# Patient Record
Sex: FEMALE | Race: WHITE | NOT HISPANIC OR LATINO | Employment: UNEMPLOYED | URBAN - METROPOLITAN AREA
[De-identification: names, ages, dates, MRNs, and addresses within clinical notes are randomized per-mention and may not be internally consistent; named-entity substitution may affect disease eponyms.]

---

## 2022-09-29 ENCOUNTER — HOSPITAL ENCOUNTER (OUTPATIENT)
Facility: OTHER | Age: 32
Discharge: HOME | DRG: 603 | End: 2022-10-04
Attending: EMERGENCY MEDICINE | Admitting: STUDENT IN AN ORGANIZED HEALTH CARE EDUCATION/TRAINING PROGRAM
Payer: MEDICAID

## 2022-09-29 DIAGNOSIS — L03.116 CELLULITIS OF HIP, LEFT: Primary | ICD-10-CM

## 2022-09-29 DIAGNOSIS — L03.312 CELLULITIS OF BACK EXCEPT BUTTOCK: ICD-10-CM

## 2022-09-29 PROBLEM — L03.317 CELLULITIS OF BUTTOCK: Status: ACTIVE | Noted: 2022-09-29

## 2022-09-29 LAB
ALBUMIN SERPL BCP-MCNC: 3.6 G/DL (ref 3.5–5.2)
ALP SERPL-CCNC: 77 U/L (ref 55–135)
ALT SERPL W/O P-5'-P-CCNC: 7 U/L (ref 10–44)
ANION GAP SERPL CALC-SCNC: 7 MMOL/L (ref 8–16)
AST SERPL-CCNC: 11 U/L (ref 10–40)
B-HCG UR QL: NEGATIVE
BASOPHILS # BLD AUTO: 0.04 K/UL (ref 0–0.2)
BASOPHILS NFR BLD: 0.3 % (ref 0–1.9)
BILIRUB SERPL-MCNC: 0.4 MG/DL (ref 0.1–1)
BUN SERPL-MCNC: 10 MG/DL (ref 6–20)
CALCIUM SERPL-MCNC: 9.2 MG/DL (ref 8.7–10.5)
CHLORIDE SERPL-SCNC: 106 MMOL/L (ref 95–110)
CO2 SERPL-SCNC: 24 MMOL/L (ref 23–29)
CREAT SERPL-MCNC: 0.8 MG/DL (ref 0.5–1.4)
CTP QC/QA: YES
CTP QC/QA: YES
DIFFERENTIAL METHOD: ABNORMAL
EOSINOPHIL # BLD AUTO: 0 K/UL (ref 0–0.5)
EOSINOPHIL NFR BLD: 0.3 % (ref 0–8)
ERYTHROCYTE [DISTWIDTH] IN BLOOD BY AUTOMATED COUNT: 12.9 % (ref 11.5–14.5)
EST. GFR  (NO RACE VARIABLE): >60 ML/MIN/1.73 M^2
GLUCOSE SERPL-MCNC: 79 MG/DL (ref 70–110)
HCT VFR BLD AUTO: 37 % (ref 37–48.5)
HGB BLD-MCNC: 12.2 G/DL (ref 12–16)
IMM GRANULOCYTES # BLD AUTO: 0.07 K/UL (ref 0–0.04)
IMM GRANULOCYTES NFR BLD AUTO: 0.5 % (ref 0–0.5)
LACTATE SERPL-SCNC: 2 MMOL/L (ref 0.5–2.2)
LDH SERPL L TO P-CCNC: 1.15 MMOL/L (ref 0.5–2.2)
LYMPHOCYTES # BLD AUTO: 1.1 K/UL (ref 1–4.8)
LYMPHOCYTES NFR BLD: 7 % (ref 18–48)
MCH RBC QN AUTO: 30.2 PG (ref 27–31)
MCHC RBC AUTO-ENTMCNC: 33 G/DL (ref 32–36)
MCV RBC AUTO: 92 FL (ref 82–98)
MONOCYTES # BLD AUTO: 1 K/UL (ref 0.3–1)
MONOCYTES NFR BLD: 6.3 % (ref 4–15)
NEUTROPHILS # BLD AUTO: 13 K/UL (ref 1.8–7.7)
NEUTROPHILS NFR BLD: 85.6 % (ref 38–73)
NRBC BLD-RTO: 0 /100 WBC
PLATELET # BLD AUTO: 374 K/UL (ref 150–450)
PMV BLD AUTO: 9.9 FL (ref 9.2–12.9)
POTASSIUM SERPL-SCNC: 3.9 MMOL/L (ref 3.5–5.1)
PROT SERPL-MCNC: 7.3 G/DL (ref 6–8.4)
RBC # BLD AUTO: 4.04 M/UL (ref 4–5.4)
SAMPLE: NORMAL
SARS-COV-2 RDRP RESP QL NAA+PROBE: POSITIVE
SODIUM SERPL-SCNC: 137 MMOL/L (ref 136–145)
WBC # BLD AUTO: 15.2 K/UL (ref 3.9–12.7)

## 2022-09-29 PROCEDURE — 96375 TX/PRO/DX INJ NEW DRUG ADDON: CPT

## 2022-09-29 PROCEDURE — 12000002 HC ACUTE/MED SURGE SEMI-PRIVATE ROOM

## 2022-09-29 PROCEDURE — 85025 COMPLETE CBC W/AUTO DIFF WBC: CPT | Performed by: NURSE PRACTITIONER

## 2022-09-29 PROCEDURE — 25000003 PHARM REV CODE 250: Performed by: NURSE PRACTITIONER

## 2022-09-29 PROCEDURE — 81025 URINE PREGNANCY TEST: CPT | Performed by: NURSE PRACTITIONER

## 2022-09-29 PROCEDURE — 99285 EMERGENCY DEPT VISIT HI MDM: CPT | Mod: 25

## 2022-09-29 PROCEDURE — 63600175 PHARM REV CODE 636 W HCPCS: Performed by: PHYSICIAN ASSISTANT

## 2022-09-29 PROCEDURE — U0002 COVID-19 LAB TEST NON-CDC: HCPCS | Performed by: PHYSICIAN ASSISTANT

## 2022-09-29 PROCEDURE — 25000003 PHARM REV CODE 250: Performed by: PHYSICIAN ASSISTANT

## 2022-09-29 PROCEDURE — G0378 HOSPITAL OBSERVATION PER HR: HCPCS

## 2022-09-29 PROCEDURE — 96365 THER/PROPH/DIAG IV INF INIT: CPT

## 2022-09-29 PROCEDURE — 80053 COMPREHEN METABOLIC PANEL: CPT | Performed by: NURSE PRACTITIONER

## 2022-09-29 PROCEDURE — 83605 ASSAY OF LACTIC ACID: CPT | Performed by: NURSE PRACTITIONER

## 2022-09-29 PROCEDURE — 63600175 PHARM REV CODE 636 W HCPCS: Performed by: NURSE PRACTITIONER

## 2022-09-29 PROCEDURE — 25000003 PHARM REV CODE 250: Performed by: SURGERY

## 2022-09-29 PROCEDURE — 63600175 PHARM REV CODE 636 W HCPCS: Performed by: SURGERY

## 2022-09-29 PROCEDURE — 96376 TX/PRO/DX INJ SAME DRUG ADON: CPT

## 2022-09-29 PROCEDURE — 87040 BLOOD CULTURE FOR BACTERIA: CPT | Mod: 59 | Performed by: NURSE PRACTITIONER

## 2022-09-29 PROCEDURE — 96361 HYDRATE IV INFUSION ADD-ON: CPT

## 2022-09-29 RX ORDER — QUETIAPINE 200 MG/1
400 TABLET, FILM COATED, EXTENDED RELEASE ORAL NIGHTLY
Status: DISCONTINUED | OUTPATIENT
Start: 2022-09-29 | End: 2022-10-04 | Stop reason: HOSPADM

## 2022-09-29 RX ORDER — HYDROCODONE BITARTRATE AND ACETAMINOPHEN 5; 325 MG/1; MG/1
1 TABLET ORAL EVERY 6 HOURS PRN
Status: DISCONTINUED | OUTPATIENT
Start: 2022-09-29 | End: 2022-09-30

## 2022-09-29 RX ORDER — CLINDAMYCIN PHOSPHATE 600 MG/50ML
600 INJECTION, SOLUTION INTRAVENOUS
Status: DISCONTINUED | OUTPATIENT
Start: 2022-09-30 | End: 2022-09-30

## 2022-09-29 RX ORDER — QUETIAPINE 400 MG/1
400 TABLET, FILM COATED, EXTENDED RELEASE ORAL NIGHTLY
COMMUNITY
Start: 2022-08-24

## 2022-09-29 RX ORDER — LISDEXAMFETAMINE DIMESYLATE 20 MG/1
20 CAPSULE ORAL EVERY MORNING
COMMUNITY
Start: 2022-09-23

## 2022-09-29 RX ORDER — SODIUM CHLORIDE 0.9 % (FLUSH) 0.9 %
10 SYRINGE (ML) INJECTION EVERY 12 HOURS PRN
Status: DISCONTINUED | OUTPATIENT
Start: 2022-09-29 | End: 2022-10-04 | Stop reason: HOSPADM

## 2022-09-29 RX ORDER — MORPHINE SULFATE 4 MG/ML
4 INJECTION, SOLUTION INTRAMUSCULAR; INTRAVENOUS EVERY 4 HOURS PRN
Status: DISCONTINUED | OUTPATIENT
Start: 2022-09-29 | End: 2022-09-30

## 2022-09-29 RX ORDER — CLINDAMYCIN PHOSPHATE 600 MG/50ML
600 INJECTION, SOLUTION INTRAVENOUS
Status: COMPLETED | OUTPATIENT
Start: 2022-09-29 | End: 2022-09-29

## 2022-09-29 RX ORDER — IBUPROFEN 400 MG/1
400 TABLET ORAL EVERY 6 HOURS PRN
Status: DISCONTINUED | OUTPATIENT
Start: 2022-09-29 | End: 2022-09-30

## 2022-09-29 RX ORDER — ACETAMINOPHEN 325 MG/1
650 TABLET ORAL EVERY 4 HOURS PRN
Status: DISCONTINUED | OUTPATIENT
Start: 2022-09-29 | End: 2022-10-04 | Stop reason: HOSPADM

## 2022-09-29 RX ORDER — MORPHINE SULFATE 4 MG/ML
4 INJECTION, SOLUTION INTRAMUSCULAR; INTRAVENOUS ONCE
Status: COMPLETED | OUTPATIENT
Start: 2022-09-29 | End: 2022-09-29

## 2022-09-29 RX ORDER — ONDANSETRON 2 MG/ML
4 INJECTION INTRAMUSCULAR; INTRAVENOUS EVERY 8 HOURS PRN
Status: DISCONTINUED | OUTPATIENT
Start: 2022-09-29 | End: 2022-10-04 | Stop reason: HOSPADM

## 2022-09-29 RX ORDER — LISDEXAMFETAMINE DIMESYLATE CAPSULES 20 MG/1
20 CAPSULE ORAL EVERY MORNING
Status: DISCONTINUED | OUTPATIENT
Start: 2022-09-30 | End: 2022-10-03

## 2022-09-29 RX ORDER — SODIUM CHLORIDE 450 MG/100ML
INJECTION, SOLUTION INTRAVENOUS CONTINUOUS
Status: DISCONTINUED | OUTPATIENT
Start: 2022-09-29 | End: 2022-10-01

## 2022-09-29 RX ADMIN — HYDROCODONE BITARTRATE AND ACETAMINOPHEN 1 TABLET: 5; 325 TABLET ORAL at 08:09

## 2022-09-29 RX ADMIN — CLINDAMYCIN PHOSPHATE 600 MG: 600 INJECTION, SOLUTION INTRAVENOUS at 04:09

## 2022-09-29 RX ADMIN — SODIUM CHLORIDE: 0.45 INJECTION, SOLUTION INTRAVENOUS at 07:09

## 2022-09-29 RX ADMIN — MORPHINE SULFATE 4 MG: 4 INJECTION, SOLUTION INTRAMUSCULAR; INTRAVENOUS at 04:09

## 2022-09-29 RX ADMIN — SODIUM CHLORIDE, SODIUM LACTATE, POTASSIUM CHLORIDE, AND CALCIUM CHLORIDE 1797 ML: .6; .31; .03; .02 INJECTION, SOLUTION INTRAVENOUS at 04:09

## 2022-09-29 RX ADMIN — MORPHINE SULFATE 4 MG: 4 INJECTION, SOLUTION INTRAMUSCULAR; INTRAVENOUS at 11:09

## 2022-09-29 RX ADMIN — ACETAMINOPHEN 650 MG: 325 TABLET, FILM COATED ORAL at 08:09

## 2022-09-29 RX ADMIN — QUETIAPINE FUMARATE 400 MG: 200 TABLET, EXTENDED RELEASE ORAL at 08:09

## 2022-09-29 RX ADMIN — MORPHINE SULFATE 4 MG: 4 INJECTION, SOLUTION INTRAMUSCULAR; INTRAVENOUS at 06:09

## 2022-09-29 RX ADMIN — ONDANSETRON 4 MG: 2 INJECTION INTRAMUSCULAR; INTRAVENOUS at 08:09

## 2022-09-29 NOTE — FIRST PROVIDER EVALUATION
Emergency Department TeleTriage Encounter Note      CHIEF COMPLAINT    Chief Complaint   Patient presents with    fever, cellulitis     Pt c.o fever onset 2 days ago. Pt also c.o red not on left hip/buttock approx 4 days that has worsened.  AAO x 3 nadn skin w.d  +redness cellulitis in appearance to left hip.         VITAL SIGNS   Initial Vitals [09/29/22 1447]   BP Pulse Resp Temp SpO2   107/66 109 18 98 °F (36.7 °C) 98 %      MAP       --            ALLERGIES    Review of patient's allergies indicates:  No Known Allergies    PROVIDER TRIAGE NOTE  This is a teletriage evaluation of a 32 y.o. female presenting to the ED with c/o fever and redness to the left hip. No wounds/drainage to the area per the patient. Last antipyretics yesterday. Afebrile at triage. Limited physical exam via telehealth: The patient is awake, alert, answering questions appropriately and is not in respiratory distress. Initial orders will be placed and care will be transferred to an alternate provider when patient is roomed for a full evaluation. Any additional orders and the final disposition will be determined by that provider.         ORDERS  Labs Reviewed   POCT URINE PREGNANCY       ED Orders (720h ago, onward)      Start Ordered     Status Ordering Provider    09/29/22 1453 09/29/22 1452  POCT urine pregnancy  Once         Ordered FLOR ODEN    09/29/22 1453 09/29/22 1452  Nursing communication  Once        Comments: Please have the patient change into a gown for examination. Thank you.    Ordered FLOR ODEN              Virtual Visit Note: The provider triage portion of this emergency department evaluation and documentation was performed via [employer], a HIPAA-compliant telemedicine application, in concert with a tele-presenter in the room. A face to face patient evaluation with one of my colleagues will occur once the patient is placed in an emergency department room.      DISCLAIMER: This note was prepared with  M*Modal voice recognition transcription software. Garbled syntax, mangled pronouns, and other bizarre constructions may be attributed to that software system.

## 2022-09-29 NOTE — H&P
"ED Observation Unit  History and Physical      I assumed care of this patient from the Main ED at onset of observation time, 5:48 PM  on 09/29/2022.       History of Present Illness:    Roselyn Vieira is a 32 y.o. female presenting with complaint of area of redness and swelling to her left lateral hip that began 4 days ago which has worsened over the past 12 hours was Dwyer present to the emergency department.  Patient also reports febrile at home over last 2 days with the highest of 101.  Denies any injury/trauma to the area, denies any recent skin breakdown to the area.       This is the extent to the patients complaints today here in the emergency department.    I reviewed the ED Provider Note dated 5:48 PM  prior to my evaluation of this patient.  I reviewed all labs and imaging performed in the Main ED, prior to patient being placed in Observation. Patient was placed in the ED Observation Unit for <principal problem not specified>.    PMHx   History reviewed. No pertinent past medical history.   History reviewed. No pertinent surgical history.     Family Hx   History reviewed. No pertinent family history.     Social Hx   Social History     Socioeconomic History    Marital status: Single   Tobacco Use    Smoking status: Every Day     Types: Vaping with nicotine    Smokeless tobacco: Never   Substance and Sexual Activity    Alcohol use: Yes     Alcohol/week: 2.0 - 3.0 standard drinks     Types: 2 - 3 Cans of beer per week    Drug use: Yes     Types: Marijuana        Vital Signs   Vitals:    09/29/22 1447 09/29/22 1611 09/29/22 1640 09/29/22 1657   BP: 107/66 119/67  117/65   BP Location: Left arm   Left arm   Patient Position: Sitting   Lying   Pulse: 109 96  91   Resp: 18 16 16 18   Temp: 98 °F (36.7 °C) 98.4 °F (36.9 °C)  (S) 99.6 °F (37.6 °C)   TempSrc: Oral Oral  Oral   SpO2: 98% 97%  99%   Weight: 59.9 kg (132 lb)      Height: 5' 9" (1.753 m)           Review of Systems  ROS: As per HPI and " below:  Constitutional: + fever.  + chills.  Eyes: No visual changes.   ENT: No sore throat. No ear pain.  Urinary: No abnormal urination.  MSK: No back pain. No joint pain.   Integument:  Swelling and erythema    Physical Exam  LMP 09/05/2022   SpO2 99%   Breastfeeding No   BMI 19.49 kg/m²   Nursing note and vital signs reviewed.  Constitutional: No acute distress.  Eyes: No conjunctival injection.  Extraocular muscles are intact.  ENT: Normal phonation.  Musculoskeletal: Good range of motion all joints.  No deformities.  Neck supple.  No meningismus. Neurovascularly intact.  Skin:  Large area of erythema is noted to the left lateral hip, it is warm to touch with induration.  No area of fluctuance is present. Area marked with surgical marker  Neuro:  AAO x3.  Psych: Appropriate, conversant.    Medications:   Scheduled Meds:  Continuous Infusions:  PRN Meds:.      Assessment/Plan:  1. Cellulitis of hip, left      Plan to reassess borders after continued IV clindamycin.  Will transition oral if improved.  Initial lactic normal.      Case was discussed with the ED provider, Mamta

## 2022-09-29 NOTE — ED PROVIDER NOTES
"Source of History:  Patient     Chief complaint:  fever, cellulitis (Pt c.o fever onset 2 days ago. Pt also c.o red not on left hip/buttock approx 4 days that has worsened.  AAO x 3 nadn skin w.d  +redness cellulitis in appearance to left hip.  )      HPI:  Roselyn Vieira is a 32 y.o. female presenting with complaint of area of redness and swelling to her left lateral hip that began 4 days ago which has worsened over the past 12 hours was Dwyer present to the emergency department.  Patient also reports febrile at home over last 2 days with the highest of 101.  Denies any injury/trauma to the area, denies any recent skin breakdown to the area.      This is the extent to the patients complaints today here in the emergency department.    Review of patient's allergies indicates:  No Known Allergies    PMH:  As per HPI and below:  History reviewed. No pertinent past medical history.  History reviewed. No pertinent surgical history.    Social History     Tobacco Use    Smoking status: Every Day     Types: Vaping with nicotine    Smokeless tobacco: Never   Substance Use Topics    Alcohol use: Yes     Alcohol/week: 2.0 - 3.0 standard drinks     Types: 2 - 3 Cans of beer per week    Drug use: Yes     Types: Marijuana       ROS: As per HPI and below:  Constitutional: No fever.  No chills.  Eyes: No visual changes.   ENT: No sore throat. No ear pain.  Urinary: No abnormal urination.  MSK: No back pain. No joint pain.   Integument:  Swelling and erythema    Physical Exam:    /65 (BP Location: Left arm, Patient Position: Lying)   Pulse 91   Temp (S) 99.6 °F (37.6 °C) (Oral)   Resp 18   Ht 5' 9" (1.753 m)   Wt 59.9 kg (132 lb)   LMP 09/05/2022   SpO2 99%   Breastfeeding No   BMI 19.49 kg/m²   Nursing note and vital signs reviewed.  Constitutional: No acute distress.  Eyes: No conjunctival injection.  Extraocular muscles are intact.  ENT: Normal phonation.  Musculoskeletal: Good range of motion all joints.  No " deformities.  Neck supple.  No meningismus. Neurovascularly intact.  Skin:  Large area of erythema is noted to the left lateral hip, it is warm to touch with induration.  No area of fluctuance is present.  Neuro:  AAO x3.  Psych: Appropriate, conversant.    Labs Reviewed   CBC W/ AUTO DIFFERENTIAL - Abnormal; Notable for the following components:       Result Value    WBC 15.20 (*)     Gran # (ANC) 13.0 (*)     Immature Grans (Abs) 0.07 (*)     Gran % 85.6 (*)     Lymph % 7.0 (*)     All other components within normal limits   COMPREHENSIVE METABOLIC PANEL - Abnormal; Notable for the following components:    ALT 7 (*)     Anion Gap 7 (*)     All other components within normal limits   CULTURE, BLOOD   CULTURE, BLOOD   LACTIC ACID, PLASMA   LACTIC ACID, PLASMA   POCT URINE PREGNANCY   ISTAT LACTATE       No orders to display         MDM/ Differential Dx:  32 y.o. female with large area of cellulitis to her left lateral hip has been present and worsening over the past 4 days.  Patient reports has been febrile for 2 days.  Labs show leukocytosis.  No electrolyte imbalances.  She was treated with fluids and clindamycin emergency department.  Due to leukocytosis and being febrile at home, will admit to the EDOU for continued IV antibiotics, lab check in the morning.  Patient was agreeable with this plan.    ED Course as of 09/29/22 1736   Thu Sep 29, 2022   1716 WBC(!): 15.20 [AS]      ED Course User Index  [AS] SANFORD Yi       Diagnostic Impression:    1. Cellulitis of hip, left         ED Disposition Condition    Observation Stable                  SANFORD Yi  09/29/22 3347

## 2022-09-29 NOTE — ED TRIAGE NOTES
Patient presents to ED with c/o abscess, redness and pain to L hip x 4-5 days with associated fever, body aches and chills.

## 2022-09-29 NOTE — Clinical Note
Diagnosis: Cellulitis of hip, left [057242]   Future Attending Provider: ROSANA RANDOLPH [[id number]]   Is the patient being sent to ED Observation?: Yes   Admitting Provider:: ROSANA RANDOLPH [[id number]]

## 2022-09-30 PROBLEM — F31.9 BIPOLAR DISORDER: Chronic | Status: ACTIVE | Noted: 2022-09-30

## 2022-09-30 PROBLEM — F90.9 ADHD: Chronic | Status: ACTIVE | Noted: 2022-09-30

## 2022-09-30 PROBLEM — L03.312 CELLULITIS OF BACK EXCEPT BUTTOCK: Status: ACTIVE | Noted: 2022-09-29

## 2022-09-30 PROBLEM — F17.290 NICOTINE DEPENDENCE DUE TO VAPING TOBACCO PRODUCT: Status: ACTIVE | Noted: 2022-09-30

## 2022-09-30 LAB
ALBUMIN SERPL BCP-MCNC: 2.8 G/DL (ref 3.5–5.2)
ALP SERPL-CCNC: 63 U/L (ref 55–135)
ALT SERPL W/O P-5'-P-CCNC: 6 U/L (ref 10–44)
ANION GAP SERPL CALC-SCNC: 6 MMOL/L (ref 8–16)
AST SERPL-CCNC: 9 U/L (ref 10–40)
BASOPHILS # BLD AUTO: 0.03 K/UL (ref 0–0.2)
BASOPHILS NFR BLD: 0.2 % (ref 0–1.9)
BILIRUB SERPL-MCNC: 0.3 MG/DL (ref 0.1–1)
BUN SERPL-MCNC: 5 MG/DL (ref 6–20)
CALCIUM SERPL-MCNC: 7.9 MG/DL (ref 8.7–10.5)
CHLORIDE SERPL-SCNC: 110 MMOL/L (ref 95–110)
CO2 SERPL-SCNC: 22 MMOL/L (ref 23–29)
CREAT SERPL-MCNC: 0.6 MG/DL (ref 0.5–1.4)
CRP SERPL-MCNC: 243.3 MG/L (ref 0–8.2)
CTP QC/QA: YES
DIFFERENTIAL METHOD: ABNORMAL
EOSINOPHIL # BLD AUTO: 0.1 K/UL (ref 0–0.5)
EOSINOPHIL NFR BLD: 0.8 % (ref 0–8)
ERYTHROCYTE [DISTWIDTH] IN BLOOD BY AUTOMATED COUNT: 12.9 % (ref 11.5–14.5)
EST. GFR  (NO RACE VARIABLE): >60 ML/MIN/1.73 M^2
GLUCOSE SERPL-MCNC: 116 MG/DL (ref 70–110)
HCT VFR BLD AUTO: 32.2 % (ref 37–48.5)
HGB BLD-MCNC: 10.6 G/DL (ref 12–16)
IMM GRANULOCYTES # BLD AUTO: 0.1 K/UL (ref 0–0.04)
IMM GRANULOCYTES NFR BLD AUTO: 0.7 % (ref 0–0.5)
LYMPHOCYTES # BLD AUTO: 1.3 K/UL (ref 1–4.8)
LYMPHOCYTES NFR BLD: 9.5 % (ref 18–48)
MCH RBC QN AUTO: 29.9 PG (ref 27–31)
MCHC RBC AUTO-ENTMCNC: 32.9 G/DL (ref 32–36)
MCV RBC AUTO: 91 FL (ref 82–98)
MONOCYTES # BLD AUTO: 1.1 K/UL (ref 0.3–1)
MONOCYTES NFR BLD: 8 % (ref 4–15)
NEUTROPHILS # BLD AUTO: 10.9 K/UL (ref 1.8–7.7)
NEUTROPHILS NFR BLD: 80.8 % (ref 38–73)
NRBC BLD-RTO: 0 /100 WBC
PLATELET # BLD AUTO: 329 K/UL (ref 150–450)
PMV BLD AUTO: 9.6 FL (ref 9.2–12.9)
POCT GLUCOSE: 108 MG/DL (ref 70–110)
POCT GLUCOSE: 131 MG/DL (ref 70–110)
POCT GLUCOSE: 90 MG/DL (ref 70–110)
POTASSIUM SERPL-SCNC: 3.7 MMOL/L (ref 3.5–5.1)
PROT SERPL-MCNC: 5.2 G/DL (ref 6–8.4)
RBC # BLD AUTO: 3.55 M/UL (ref 4–5.4)
SARS-COV-2 RDRP RESP QL NAA+PROBE: NEGATIVE
SODIUM SERPL-SCNC: 138 MMOL/L (ref 136–145)
WBC # BLD AUTO: 13.51 K/UL (ref 3.9–12.7)

## 2022-09-30 PROCEDURE — 25000003 PHARM REV CODE 250: Performed by: STUDENT IN AN ORGANIZED HEALTH CARE EDUCATION/TRAINING PROGRAM

## 2022-09-30 PROCEDURE — 63600175 PHARM REV CODE 636 W HCPCS: Performed by: NURSE PRACTITIONER

## 2022-09-30 PROCEDURE — 36415 COLL VENOUS BLD VENIPUNCTURE: CPT | Performed by: STUDENT IN AN ORGANIZED HEALTH CARE EDUCATION/TRAINING PROGRAM

## 2022-09-30 PROCEDURE — G0378 HOSPITAL OBSERVATION PER HR: HCPCS

## 2022-09-30 PROCEDURE — 96366 THER/PROPH/DIAG IV INF ADDON: CPT

## 2022-09-30 PROCEDURE — S4991 NICOTINE PATCH NONLEGEND: HCPCS | Performed by: NURSE PRACTITIONER

## 2022-09-30 PROCEDURE — 63600175 PHARM REV CODE 636 W HCPCS

## 2022-09-30 PROCEDURE — 99223 PR INITIAL HOSPITAL CARE,LEVL III: ICD-10-PCS | Mod: ,,,

## 2022-09-30 PROCEDURE — 25000003 PHARM REV CODE 250: Performed by: PHYSICIAN ASSISTANT

## 2022-09-30 PROCEDURE — 85025 COMPLETE CBC W/AUTO DIFF WBC: CPT | Performed by: PHYSICIAN ASSISTANT

## 2022-09-30 PROCEDURE — 82962 GLUCOSE BLOOD TEST: CPT

## 2022-09-30 PROCEDURE — 63600175 PHARM REV CODE 636 W HCPCS: Performed by: SURGERY

## 2022-09-30 PROCEDURE — 63600175 PHARM REV CODE 636 W HCPCS: Performed by: STUDENT IN AN ORGANIZED HEALTH CARE EDUCATION/TRAINING PROGRAM

## 2022-09-30 PROCEDURE — 96361 HYDRATE IV INFUSION ADD-ON: CPT

## 2022-09-30 PROCEDURE — 25500020 PHARM REV CODE 255: Performed by: STUDENT IN AN ORGANIZED HEALTH CARE EDUCATION/TRAINING PROGRAM

## 2022-09-30 PROCEDURE — 80053 COMPREHEN METABOLIC PANEL: CPT | Performed by: PHYSICIAN ASSISTANT

## 2022-09-30 PROCEDURE — 25000003 PHARM REV CODE 250: Performed by: SURGERY

## 2022-09-30 PROCEDURE — 96367 TX/PROPH/DG ADDL SEQ IV INF: CPT

## 2022-09-30 PROCEDURE — 63600175 PHARM REV CODE 636 W HCPCS: Performed by: EMERGENCY MEDICINE

## 2022-09-30 PROCEDURE — A9585 GADOBUTROL INJECTION: HCPCS | Performed by: STUDENT IN AN ORGANIZED HEALTH CARE EDUCATION/TRAINING PROGRAM

## 2022-09-30 PROCEDURE — 99223 1ST HOSP IP/OBS HIGH 75: CPT | Mod: ,,,

## 2022-09-30 PROCEDURE — 96372 THER/PROPH/DIAG INJ SC/IM: CPT

## 2022-09-30 PROCEDURE — 11000001 HC ACUTE MED/SURG PRIVATE ROOM

## 2022-09-30 PROCEDURE — 96375 TX/PRO/DX INJ NEW DRUG ADDON: CPT | Mod: 59

## 2022-09-30 PROCEDURE — 94761 N-INVAS EAR/PLS OXIMETRY MLT: CPT

## 2022-09-30 PROCEDURE — 25000003 PHARM REV CODE 250: Performed by: NURSE PRACTITIONER

## 2022-09-30 PROCEDURE — 96376 TX/PRO/DX INJ SAME DRUG ADON: CPT

## 2022-09-30 PROCEDURE — 96372 THER/PROPH/DIAG INJ SC/IM: CPT | Mod: 59 | Performed by: SURGERY

## 2022-09-30 PROCEDURE — 25000003 PHARM REV CODE 250

## 2022-09-30 PROCEDURE — 63600175 PHARM REV CODE 636 W HCPCS: Performed by: PHYSICIAN ASSISTANT

## 2022-09-30 PROCEDURE — 86140 C-REACTIVE PROTEIN: CPT | Performed by: STUDENT IN AN ORGANIZED HEALTH CARE EDUCATION/TRAINING PROGRAM

## 2022-09-30 RX ORDER — MAG HYDROX/ALUMINUM HYD/SIMETH 200-200-20
30 SUSPENSION, ORAL (FINAL DOSE FORM) ORAL 4 TIMES DAILY PRN
Status: DISCONTINUED | OUTPATIENT
Start: 2022-09-30 | End: 2022-10-04 | Stop reason: HOSPADM

## 2022-09-30 RX ORDER — ENOXAPARIN SODIUM 100 MG/ML
40 INJECTION SUBCUTANEOUS EVERY 24 HOURS
Status: DISCONTINUED | OUTPATIENT
Start: 2022-09-30 | End: 2022-10-04 | Stop reason: HOSPADM

## 2022-09-30 RX ORDER — VANCOMYCIN HCL IN 5 % DEXTROSE 1G/250ML
1000 PLASTIC BAG, INJECTION (ML) INTRAVENOUS
Status: DISCONTINUED | OUTPATIENT
Start: 2022-10-01 | End: 2022-10-02

## 2022-09-30 RX ORDER — ONDANSETRON 2 MG/ML
4 INJECTION INTRAMUSCULAR; INTRAVENOUS
Status: COMPLETED | OUTPATIENT
Start: 2022-09-30 | End: 2022-09-30

## 2022-09-30 RX ORDER — CLONAZEPAM 1 MG/1
1 TABLET ORAL NIGHTLY PRN
COMMUNITY
Start: 2022-08-31

## 2022-09-30 RX ORDER — IBUPROFEN 200 MG
1 TABLET ORAL
Status: COMPLETED | OUTPATIENT
Start: 2022-09-30 | End: 2022-10-01

## 2022-09-30 RX ORDER — NALOXONE HCL 0.4 MG/ML
0.02 VIAL (ML) INJECTION
Status: DISCONTINUED | OUTPATIENT
Start: 2022-09-30 | End: 2022-10-04 | Stop reason: HOSPADM

## 2022-09-30 RX ORDER — GADOBUTROL 604.72 MG/ML
6 INJECTION INTRAVENOUS
Status: COMPLETED | OUTPATIENT
Start: 2022-09-30 | End: 2022-09-30

## 2022-09-30 RX ORDER — TALC
6 POWDER (GRAM) TOPICAL NIGHTLY PRN
Status: DISCONTINUED | OUTPATIENT
Start: 2022-09-30 | End: 2022-10-04 | Stop reason: HOSPADM

## 2022-09-30 RX ORDER — SODIUM CHLORIDE 0.9 % (FLUSH) 0.9 %
10 SYRINGE (ML) INJECTION EVERY 12 HOURS PRN
Status: DISCONTINUED | OUTPATIENT
Start: 2022-09-30 | End: 2022-10-04 | Stop reason: HOSPADM

## 2022-09-30 RX ORDER — KETOROLAC TROMETHAMINE 30 MG/ML
30 INJECTION, SOLUTION INTRAMUSCULAR; INTRAVENOUS EVERY 6 HOURS PRN
Status: DISCONTINUED | OUTPATIENT
Start: 2022-09-30 | End: 2022-10-01

## 2022-09-30 RX ORDER — KETOROLAC TROMETHAMINE 30 MG/ML
15 INJECTION, SOLUTION INTRAMUSCULAR; INTRAVENOUS EVERY 6 HOURS PRN
Status: DISPENSED | OUTPATIENT
Start: 2022-09-30 | End: 2022-10-03

## 2022-09-30 RX ORDER — HYDROMORPHONE HYDROCHLORIDE 1 MG/ML
0.2 INJECTION, SOLUTION INTRAMUSCULAR; INTRAVENOUS; SUBCUTANEOUS EVERY 6 HOURS PRN
Status: DISCONTINUED | OUTPATIENT
Start: 2022-09-30 | End: 2022-10-01

## 2022-09-30 RX ORDER — POLYETHYLENE GLYCOL 3350 17 G/17G
17 POWDER, FOR SOLUTION ORAL DAILY
Status: DISCONTINUED | OUTPATIENT
Start: 2022-10-01 | End: 2022-10-04 | Stop reason: HOSPADM

## 2022-09-30 RX ORDER — BISACODYL 10 MG
10 SUPPOSITORY, RECTAL RECTAL DAILY PRN
Status: DISCONTINUED | OUTPATIENT
Start: 2022-09-30 | End: 2022-10-04 | Stop reason: HOSPADM

## 2022-09-30 RX ORDER — AMOXICILLIN 250 MG
1 CAPSULE ORAL 2 TIMES DAILY
Status: DISCONTINUED | OUTPATIENT
Start: 2022-09-30 | End: 2022-10-04 | Stop reason: HOSPADM

## 2022-09-30 RX ORDER — HYDROMORPHONE HYDROCHLORIDE 1 MG/ML
1 INJECTION, SOLUTION INTRAMUSCULAR; INTRAVENOUS; SUBCUTANEOUS
Status: COMPLETED | OUTPATIENT
Start: 2022-09-30 | End: 2022-09-30

## 2022-09-30 RX ADMIN — ONDANSETRON 4 MG: 2 INJECTION INTRAMUSCULAR; INTRAVENOUS at 02:09

## 2022-09-30 RX ADMIN — QUETIAPINE FUMARATE 400 MG: 200 TABLET, EXTENDED RELEASE ORAL at 09:09

## 2022-09-30 RX ADMIN — SODIUM CHLORIDE: 0.45 INJECTION, SOLUTION INTRAVENOUS at 09:09

## 2022-09-30 RX ADMIN — CLINDAMYCIN IN 5 PERCENT DEXTROSE 600 MG: 12 INJECTION, SOLUTION INTRAVENOUS at 01:09

## 2022-09-30 RX ADMIN — MORPHINE SULFATE 4 MG: 4 INJECTION, SOLUTION INTRAMUSCULAR; INTRAVENOUS at 08:09

## 2022-09-30 RX ADMIN — HYDROMORPHONE HYDROCHLORIDE 1 MG: 1 INJECTION, SOLUTION INTRAMUSCULAR; INTRAVENOUS; SUBCUTANEOUS at 12:09

## 2022-09-30 RX ADMIN — HYDROCODONE BITARTRATE AND ACETAMINOPHEN 1 TABLET: 5; 325 TABLET ORAL at 03:09

## 2022-09-30 RX ADMIN — ONDANSETRON 4 MG: 2 INJECTION INTRAMUSCULAR; INTRAVENOUS at 12:09

## 2022-09-30 RX ADMIN — Medication 1 PATCH: at 02:09

## 2022-09-30 RX ADMIN — CLINDAMYCIN IN 5 PERCENT DEXTROSE 600 MG: 12 INJECTION, SOLUTION INTRAVENOUS at 08:09

## 2022-09-30 RX ADMIN — VANCOMYCIN HYDROCHLORIDE 1500 MG: 1.5 INJECTION, POWDER, LYOPHILIZED, FOR SOLUTION INTRAVENOUS at 03:09

## 2022-09-30 RX ADMIN — GADOBUTROL 6 ML: 604.72 INJECTION INTRAVENOUS at 07:09

## 2022-09-30 RX ADMIN — MORPHINE SULFATE 4 MG: 4 INJECTION, SOLUTION INTRAMUSCULAR; INTRAVENOUS at 02:09

## 2022-09-30 RX ADMIN — CEFTRIAXONE 2 G: 2 INJECTION, SOLUTION INTRAVENOUS at 02:09

## 2022-09-30 RX ADMIN — SENNOSIDES AND DOCUSATE SODIUM 1 TABLET: 50; 8.6 TABLET ORAL at 08:09

## 2022-09-30 RX ADMIN — ACETAMINOPHEN 650 MG: 325 TABLET, FILM COATED ORAL at 04:09

## 2022-09-30 RX ADMIN — SODIUM CHLORIDE: 0.45 INJECTION, SOLUTION INTRAVENOUS at 05:09

## 2022-09-30 RX ADMIN — ENOXAPARIN SODIUM 40 MG: 100 INJECTION SUBCUTANEOUS at 05:09

## 2022-09-30 RX ADMIN — KETOROLAC TROMETHAMINE 15 MG: 30 INJECTION, SOLUTION INTRAMUSCULAR; INTRAVENOUS at 08:09

## 2022-09-30 NOTE — ED NOTES
Pt resting quietly on hospital bed; c/o increasing pain to L hip, rates 6/10, progressively worsening. No acute distress noted. Pt denies restroom needs at this time; is able to reposition self on hospital bed. Bed locked in lowest position; side rails up and locked x 2; call light, bedside table, and personal belongings within reach. Room assessed for safety measures and cleanliness; no action needed at this time. Updated on plan of care.  Pt instructed to alert nurse for any needs; verbalizes understanding. Pt denies any addtl needs or complaints at this time; will continue to monitor.

## 2022-09-30 NOTE — ASSESSMENT & PLAN NOTE
- Fever, chills and worsening pain and erythema for 3 days, worsening, with Tmax 102 at home and associated limitation to ambulation due to pain. Denies insect bite or trauma to left hip, denies recent dental work, hx of diabetes nor is she on long term steroids.   - HDS with Tmax 100.8 and tachycardia 103 . Leukocytosis 15.2 with elevated .3 and lactate 2.0. Started on IV Clindamycin 600mg q8. US done at ER and shown no fluid collection. Despite downtrending WBC to 13.51 today, patient reports persistent pain to left hip with increased erythema (see images).   - Extremely tender to touch, 10/10 with movement, no drainage, ROM to left hip limited by pain.  - Hypocalcemia 7.9, corrected for albumin - 8.5     Plan:  - MRI left hip with concerns of left hip joint involvement   - Blood cultures pending - NGTD  - Broaden abx cover to IV Ceftriaxone 2g qd and IV Vancomycin as per pharmacy  - Pain management with acetaminophen, toradol 15/30 and IV dilaudid 0.2mg for breakthrough pain   - Trend CBC

## 2022-09-30 NOTE — ED NOTES
Pt resting comfortably in hospital bed, VSS NAD noted. Side rails up X 2, call light is within reach.  Rates pain as 3 with movement, denies pain when lying still. Denies any addtl needs at this time. Will continue to monitor.

## 2022-09-30 NOTE — SUBJECTIVE & OBJECTIVE
Past Medical History:   Diagnosis Date    ADHD (attention deficit hyperactivity disorder)     Anxiety disorder, unspecified     Bipolar disorder, unspecified        History reviewed. No pertinent surgical history.    Review of patient's allergies indicates:  No Known Allergies    No current facility-administered medications on file prior to encounter.     Current Outpatient Medications on File Prior to Encounter   Medication Sig    clonazePAM (KLONOPIN) 1 MG tablet Take 1 mg by mouth nightly as needed.    QUEtiapine (SEROQUEL XR) 400 MG Tb24 Take 400 mg by mouth every evening.    VYVANSE 20 mg capsule Take 20 mg by mouth every morning.     Family History       Problem Relation (Age of Onset)    No Known Problems Mother, Father, Sister, Brother          Tobacco Use    Smoking status: Every Day     Types: Vaping with nicotine    Smokeless tobacco: Never    Tobacco comments:     On nicotine replacement therapy   Substance and Sexual Activity    Alcohol use: Yes     Alcohol/week: 2.0 - 3.0 standard drinks     Types: 2 - 3 Cans of beer per week    Drug use: Yes     Types: Marijuana    Sexual activity: Not on file     Review of Systems   Constitutional:  Positive for chills and fever.   HENT:  Negative for congestion and sore throat.    Eyes:  Negative for pain and redness.   Respiratory:  Negative for chest tightness and shortness of breath.    Cardiovascular:  Negative for chest pain and leg swelling.   Gastrointestinal:  Negative for abdominal distention, abdominal pain, diarrhea, nausea and vomiting.   Genitourinary:  Negative for difficulty urinating.   Musculoskeletal:  Positive for gait problem (walks with a limp due to left hip pain). Negative for joint swelling.   Skin:  Negative for rash and wound.   Neurological:  Negative for weakness and headaches.   Psychiatric/Behavioral:  Negative for agitation and behavioral problems.    Objective:     Vital Signs (Most Recent):  Temp: 99.7 °F (37.6 °C) (09/30/22  1733)  Pulse: 96 (09/30/22 1621)  Resp: 18 (09/30/22 1428)  BP: 119/68 (09/30/22 1621)  SpO2: 100 % (09/30/22 1621) Vital Signs (24h Range):  Temp:  [97.6 °F (36.4 °C)-100.8 °F (38.2 °C)] 99.7 °F (37.6 °C)  Pulse:  [] 96  Resp:  [16-20] 18  SpO2:  [98 %-100 %] 100 %  BP: ()/(52-81) 119/68     Weight: 59.9 kg (132 lb)  Body mass index is 19.49 kg/m².    Physical Exam  Vitals and nursing note reviewed.   Constitutional:       Appearance: She is not ill-appearing.   HENT:      Head: Normocephalic and atraumatic.      Nose: No congestion or rhinorrhea.   Eyes:      General: No scleral icterus.        Right eye: No discharge.         Left eye: No discharge.   Cardiovascular:      Rate and Rhythm: Normal rate and regular rhythm.      Pulses: Normal pulses.      Heart sounds: Normal heart sounds.   Pulmonary:      Effort: Pulmonary effort is normal.      Breath sounds: Normal breath sounds.   Abdominal:      General: There is no distension.      Palpations: Abdomen is soft.      Comments: Abdominal discomfort ?antibiotic therapy     Musculoskeletal:         General: Swelling (left hip) and tenderness (left area near the hip joint) present.      Cervical back: Normal range of motion and neck supple.      Right lower leg: No edema.      Left lower leg: No edema.   Skin:     General: Skin is warm and dry.      Findings: Bruising (left back, area of cellulitis, see images) and erythema (left back, area of cellulitis) present.   Neurological:      Mental Status: She is alert and oriented to person, place, and time. Mental status is at baseline.   Psychiatric:         Mood and Affect: Mood normal.         Behavior: Behavior normal.           Significant Labs: All pertinent labs within the past 24 hours have been reviewed.  Blood Culture:   Recent Labs   Lab 09/29/22  1628 09/29/22  1631   LABBLOO No Growth to date No Growth to date     BMP:   Recent Labs   Lab 09/30/22  0554   *      K 3.7      CO2  22*   BUN 5*   CREATININE 0.6   CALCIUM 7.9*     CBC:   Recent Labs   Lab 09/29/22  1625 09/30/22  0554   WBC 15.20* 13.51*   HGB 12.2 10.6*   HCT 37.0 32.2*    329     CMP:   Recent Labs   Lab 09/29/22  1625 09/30/22  0554    138   K 3.9 3.7    110   CO2 24 22*   GLU 79 116*   BUN 10 5*   CREATININE 0.8 0.6   CALCIUM 9.2 7.9*   PROT 7.3 5.2*   ALBUMIN 3.6 2.8*   BILITOT 0.4 0.3   ALKPHOS 77 63   AST 11 9*   ALT 7* 6*   ANIONGAP 7* 6*     Lactic Acid:   Recent Labs   Lab 09/29/22  1631   LACTATE 2.0       Significant Imaging: I have reviewed and interpreted all pertinent imaging results/findings within the past 24 hours.

## 2022-09-30 NOTE — HPI
Roselyn Vieira is a 32 year old female with previous smoking history, ADHD presenting with 3 day onset of left hip cellulitis with associated fever, chills and worsening pain and erythema. Patient denies remembering any insect bite or trauma to left hip, but would be a possibility of getting it as she is outdoors most of the time with her dog and working as a . Left hip soreness started 3 days PTA, then fever Tmax 102 the next day and increasing area of erythema 1 day PTA with limitations to ambulation due to severe pain. Patient reports walking with a limp due to pain. Patient denies headache, nausea, vomiting, diarrhea, chest pain, SOB. Patient also denies recent dental work, hx of diabetes nor is she on long term steroids.    HDS with Tmax 100.8. Leukocytosis 15.2 with elevated .3 and lactate 2.0. Started on IV Clindamycin 600mg q8. US done at ER and shown no fluid collection. Despite downtrending WBC to 13.51 today, patient reports persistent pain to left hip with increased erythema (see images).     Patient is upgraded to Observation status and will be managed by Hospital Medicine.

## 2022-09-30 NOTE — ED NOTES
Report given to JUAN Tesfaye. Pt to be transferred to Room 306 after MRI via wheel chair with transport escort. Pt belongings with pt. VSS. Safety measures in place.

## 2022-09-30 NOTE — PROGRESS NOTES
ED Observation Unit  Progress Note      HPI   Roselyn Vieira is a 32 y.o. female presenting with complaint of area of redness and swelling to her left lateral hip that began 4 days ago which has worsened over the past 12 hours was Dwyer present to the emergency department.  Patient also reports febrile at home over last 2 days with the highest of 101.  Denies any injury/trauma to the area, denies any recent skin breakdown to the area.    Interval History   Some improvement white count is noted.  Afebrile overnight.  Patient reports worsening pain, erythema, swelling.  Repeat bedside ultrasound performed, no fluid collection identified.  No fluctuance.  Patient remains with full range of motion of the left hip.  Do not suspect joint involvement.    Given continued/worsening symptoms, will discuss with Hospital Medicine for upgrade and continued care.    PMHx   History reviewed. No pertinent past medical history.   History reviewed. No pertinent surgical history.     Family Hx   History reviewed. No pertinent family history.     Social Hx   Social History     Socioeconomic History    Marital status: Single   Tobacco Use    Smoking status: Every Day     Types: Vaping with nicotine    Smokeless tobacco: Never   Substance and Sexual Activity    Alcohol use: Yes     Alcohol/week: 2.0 - 3.0 standard drinks     Types: 2 - 3 Cans of beer per week    Drug use: Yes     Types: Marijuana     Social Determinants of Health     Financial Resource Strain: Low Risk     Difficulty of Paying Living Expenses: Not very hard   Food Insecurity: Food Insecurity Present    Worried About Running Out of Food in the Last Year: Sometimes true    Ran Out of Food in the Last Year: Never true   Transportation Needs: Unmet Transportation Needs    Lack of Transportation (Medical): Yes    Lack of Transportation (Non-Medical): Yes   Physical Activity: Sufficiently Active    Days of Exercise per Week: 5 days    Minutes of Exercise per Session: 40 min    Social Connections: Unknown    Frequency of Communication with Friends and Family: More than three times a week    Frequency of Social Gatherings with Friends and Family: Twice a week    Active Member of Clubs or Organizations: No    Attends Club or Organization Meetings: Patient refused    Marital Status: Never    Housing Stability: Low Risk     Unable to Pay for Housing in the Last Year: No    Number of Places Lived in the Last Year: 2    Unstable Housing in the Last Year: No        Vital Signs   Vitals:    09/30/22 0357 09/30/22 0816 09/30/22 0839 09/30/22 0848   BP: (!) 100/55 (!) 89/52 (!) 98/53    BP Location: Left arm Left arm Right arm    Patient Position: Lying Lying Lying    Pulse: 86 81 81    Resp: 18   18   Temp: 98.1 °F (36.7 °C) 97.6 °F (36.4 °C)     TempSrc: Oral Oral     SpO2: 98% 100% 100%    Weight:       Height:            Review of Systems  Review of Systems   Constitutional:  Positive for fever.   HENT:  Negative for congestion.    Respiratory:  Negative for cough and shortness of breath.    Cardiovascular:  Negative for chest pain.   Gastrointestinal:  Negative for abdominal pain.   Genitourinary:  Negative for dysuria.   Musculoskeletal:  Negative for joint pain and myalgias.   Skin:  Positive for rash (Consistent with cellulitis the left hip).   Neurological:  Negative for headaches.   Psychiatric/Behavioral: Negative.       Brief Physical Exam/Reassessment   Physical Exam  Constitutional:       General: She is not in acute distress.  HENT:      Head: Normocephalic and atraumatic.   Cardiovascular:      Rate and Rhythm: Normal rate and regular rhythm.      Heart sounds: Normal heart sounds.   Pulmonary:      Effort: Pulmonary effort is normal.      Breath sounds: Normal breath sounds.   Musculoskeletal:         General: Normal range of motion.      Left hip: Tenderness (Erythema and tenderness associated cellulitis.  No fluctuance.  No abscess formation.) present. Normal range of  motion. Normal strength.   Skin:     General: Skin is warm and dry.      Findings: Erythema present. No abscess.   Neurological:      General: No focal deficit present.      Mental Status: She is alert.   Psychiatric:         Mood and Affect: Mood normal.         Behavior: Behavior normal.       Labs/Imaging   Labs Reviewed   CBC W/ AUTO DIFFERENTIAL - Abnormal; Notable for the following components:       Result Value    WBC 15.20 (*)     Gran # (ANC) 13.0 (*)     Immature Grans (Abs) 0.07 (*)     Gran % 85.6 (*)     Lymph % 7.0 (*)     All other components within normal limits   COMPREHENSIVE METABOLIC PANEL - Abnormal; Notable for the following components:    ALT 7 (*)     Anion Gap 7 (*)     All other components within normal limits   COMPREHENSIVE METABOLIC PANEL - Abnormal; Notable for the following components:    CO2 22 (*)     Glucose 116 (*)     BUN 5 (*)     Calcium 7.9 (*)     Total Protein 5.2 (*)     Albumin 2.8 (*)     AST 9 (*)     ALT 6 (*)     Anion Gap 6 (*)     All other components within normal limits   CBC W/ AUTO DIFFERENTIAL - Abnormal; Notable for the following components:    WBC 13.51 (*)     RBC 3.55 (*)     Hemoglobin 10.6 (*)     Hematocrit 32.2 (*)     Immature Granulocytes 0.7 (*)     Gran # (ANC) 10.9 (*)     Immature Grans (Abs) 0.10 (*)     Mono # 1.1 (*)     Gran % 80.8 (*)     Lymph % 9.5 (*)     All other components within normal limits   SARS-COV-2 RDRP GENE - Abnormal; Notable for the following components:    POC Rapid COVID Positive (*)     All other components within normal limits   CULTURE, BLOOD    Narrative:     Aerobic and anaerobic   CULTURE, BLOOD    Narrative:     Aerobic and anaerobic   LACTIC ACID, PLASMA   POCT URINE PREGNANCY   SARS-COV-2 RDRP GENE   ISTAT LACTATE   POCT GLUCOSE      Imaging Results    None          I reviewed all labs, imaging, EKGs.     Plan   Cellulitis of the left hip   -Currently on IV clindamycin day 2:  Patient reports worsening pain.   Worsening erythema and swelling. No joint involvement; remains with full ROM of the hip.    -Bedside ultrasound without fluid collection; no fluctuance.   -Continued leukocytosis, but improving.   -Afebrile overnight.    I have discussed this case with Dr. Montero. Upgrade to .

## 2022-09-30 NOTE — ED NOTES
Pt resting comfortably in hospital bed, vital signs stable, NAD noted. Side rails up X 2, call light is within reach. Denies any needs at this time. Will continue to monitor.

## 2022-09-30 NOTE — ASSESSMENT & PLAN NOTE
Previous 1 pack a day smoker of ?years, currently vapes   Counseled to quit, amendable     - Continue nicotine replacement therapy 21mg/24hr

## 2022-09-30 NOTE — H&P
[facility] Emergency Dept (Observation)  [facility] Medicine  History & Physical    Patient Name: Roselyn Vieira  MRN: [id number]  Patient Class: OP- Observation  Admission Date: 9/29/2022  Attending Physician: Mauricio Montero MD   Primary Care Provider: Primary Doctor No    Patient information was obtained from patient, past medical records and ER records.     Subjective:     Principal Problem:Cellulitis of back except buttock    Chief Complaint:   Chief Complaint   Patient presents with    fever, cellulitis     Pt c.o fever onset 2 days ago. Pt also c.o red not on left hip/buttock approx 4 days that has worsened.  AAO x 3 nadn skin w.d  +redness cellulitis in appearance to left hip.          HPI: Roselyn Vieira is a 32 year old female with previous smoking history, ADHD presenting with 3 day onset of left hip cellulitis with associated fever, chills and worsening pain and erythema. Patient denies remembering any insect bite or trauma to left hip, but would be a possibility of getting it as she is outdoors most of the time with her dog and working as a . Left hip soreness started 3 days PTA, then fever Tmax 102 the next day and increasing area of erythema 1 day PTA with limitations to ambulation due to severe pain. Patient reports walking with a limp due to pain. Patient denies headache, nausea, vomiting, diarrhea, chest pain, SOB. Patient also denies recent dental work, hx of diabetes nor is she on long term steroids.    HDS with Tmax 100.8. Leukocytosis 15.2 with elevated .3 and lactate 2.0. Started on IV Clindamycin 600mg q8. US done at ER and shown no fluid collection. Despite downtrending WBC to 13.51 today, patient reports persistent pain to left hip with increased erythema (see images).     Patient is upgraded to Observation status and will be managed by Hospital Medicine.       Past Medical History:   Diagnosis Date    ADHD (attention deficit hyperactivity disorder)     Anxiety disorder,  unspecified     Bipolar disorder, unspecified        History reviewed. No pertinent surgical history.    Review of patient's allergies indicates:  No Known Allergies    No current facility-administered medications on file prior to encounter.     Current Outpatient Medications on File Prior to Encounter   Medication Sig    clonazePAM (KLONOPIN) 1 MG tablet Take 1 mg by mouth nightly as needed.    QUEtiapine (SEROQUEL XR) 400 MG Tb24 Take 400 mg by mouth every evening.    VYVANSE 20 mg capsule Take 20 mg by mouth every morning.     Family History       Problem Relation (Age of Onset)    No Known Problems Mother, Father, Sister, Brother          Tobacco Use    Smoking status: Every Day     Types: Vaping with nicotine    Smokeless tobacco: Never    Tobacco comments:     On nicotine replacement therapy   Substance and Sexual Activity    Alcohol use: Yes     Alcohol/week: 2.0 - 3.0 standard drinks     Types: 2 - 3 Cans of beer per week    Drug use: Yes     Types: Marijuana    Sexual activity: Not on file     Review of Systems   Constitutional:  Positive for chills and fever.   HENT:  Negative for congestion and sore throat.    Eyes:  Negative for pain and redness.   Respiratory:  Negative for chest tightness and shortness of breath.    Cardiovascular:  Negative for chest pain and leg swelling.   Gastrointestinal:  Negative for abdominal distention, abdominal pain, diarrhea, nausea and vomiting.   Genitourinary:  Negative for difficulty urinating.   Musculoskeletal:  Positive for gait problem (walks with a limp due to left hip pain). Negative for joint swelling.   Skin:  Negative for rash and wound.   Neurological:  Negative for weakness and headaches.   Psychiatric/Behavioral:  Negative for agitation and behavioral problems.    Objective:     Vital Signs (Most Recent):  Temp: 99.7 °F (37.6 °C) (09/30/22 1733)  Pulse: 96 (09/30/22 1621)  Resp: 18 (09/30/22 1428)  BP: 119/68 (09/30/22 1621)  SpO2: 100 % (09/30/22  1621) Vital Signs (24h Range):  Temp:  [97.6 °F (36.4 °C)-100.8 °F (38.2 °C)] 99.7 °F (37.6 °C)  Pulse:  [] 96  Resp:  [16-20] 18  SpO2:  [98 %-100 %] 100 %  BP: ()/(52-81) 119/68     Weight: 59.9 kg (132 lb)  Body mass index is 19.49 kg/m².    Physical Exam  Vitals and nursing note reviewed.   Constitutional:       Appearance: She is not ill-appearing.   HENT:      Head: Normocephalic and atraumatic.      Nose: No congestion or rhinorrhea.   Eyes:      General: No scleral icterus.        Right eye: No discharge.         Left eye: No discharge.   Cardiovascular:      Rate and Rhythm: Normal rate and regular rhythm.      Pulses: Normal pulses.      Heart sounds: Normal heart sounds.   Pulmonary:      Effort: Pulmonary effort is normal.      Breath sounds: Normal breath sounds.   Abdominal:      General: There is no distension.      Palpations: Abdomen is soft.      Comments: Abdominal discomfort ?antibiotic therapy     Musculoskeletal:         General: Swelling (left hip) and tenderness (left area near the hip joint) present.      Cervical back: Normal range of motion and neck supple.      Right lower leg: No edema.      Left lower leg: No edema.   Skin:     General: Skin is warm and dry.      Findings: Bruising (left back, area of cellulitis, see images) and erythema (left back, area of cellulitis) present.   Neurological:      Mental Status: She is alert and oriented to person, place, and time. Mental status is at baseline.   Psychiatric:         Mood and Affect: Mood normal.         Behavior: Behavior normal.           Significant Labs: All pertinent labs within the past 24 hours have been reviewed.  Blood Culture:   Recent Labs   Lab 09/29/22  1628 09/29/22  1631   LABBLOO No Growth to date No Growth to date     BMP:   Recent Labs   Lab 09/30/22  0554   *      K 3.7      CO2 22*   BUN 5*   CREATININE 0.6   CALCIUM 7.9*     CBC:   Recent Labs   Lab 09/29/22  1625 09/30/22  0554   WBC  15.20* 13.51*   HGB 12.2 10.6*   HCT 37.0 32.2*    329     CMP:   Recent Labs   Lab 09/29/22  1625 09/30/22  0554    138   K 3.9 3.7    110   CO2 24 22*   GLU 79 116*   BUN 10 5*   CREATININE 0.8 0.6   CALCIUM 9.2 7.9*   PROT 7.3 5.2*   ALBUMIN 3.6 2.8*   BILITOT 0.4 0.3   ALKPHOS 77 63   AST 11 9*   ALT 7* 6*   ANIONGAP 7* 6*     Lactic Acid:   Recent Labs   Lab 09/29/22  1631   LACTATE 2.0       Significant Imaging: I have reviewed and interpreted all pertinent imaging results/findings within the past 24 hours.    Assessment/Plan:     * Cellulitis of back except buttock  - Fever, chills and worsening pain and erythema for 3 days, worsening, with Tmax 102 at home and associated limitation to ambulation due to pain. Denies insect bite or trauma to left hip, denies recent dental work, hx of diabetes nor is she on long term steroids.   - HDS with Tmax 100.8 and tachycardia 103 . Leukocytosis 15.2 with elevated .3 and lactate 2.0. Started on IV Clindamycin 600mg q8. US done at ER and shown no fluid collection. Despite downtrending WBC to 13.51 today, patient reports persistent pain to left hip with increased erythema (see images).   - Extremely tender to touch, 10/10 with movement, no drainage, ROM to left hip limited by pain.  - Hypocalcemia 7.9, corrected for albumin - 8.5     Plan:  - MRI left hip with concerns of left hip joint involvement   - Blood cultures pending - NGTD  - Broaden abx cover to IV Ceftriaxone 2g qd and IV Vancomycin as per pharmacy  - Pain management with acetaminophen, toradol 15/30 and IV dilaudid 0.2mg for breakthrough pain   - Trend CBC      ADHD  Continue home dose lisdexamfetamine 20mg qd      Bipolar disorder  Resume home dose quetiapine 400mg qd    Nicotine dependence due to vaping tobacco product  Previous 1 pack a day smoker of ?years, currently vapes   Counseled to quit, amendable     - Continue nicotine replacement therapy 21mg/24hr        VTE Risk Mitigation  (From admission, onward)         Ordered     enoxaparin injection 40 mg  Daily         09/30/22 1541     IP VTE HIGH RISK PATIENT  Once         09/30/22 1541     Place sequential compression device  Until discontinued         09/30/22 1541     Place ANDREW hose  Until discontinued         09/29/22 [id number]              David Gutiérrez NP  Department of Hospital Medicine   [facility] - Emergency Dept (Observation)

## 2022-09-30 NOTE — PROGRESS NOTES
Pharmacokinetic Initial Assessment: IV Vancomycin    Assessment/Plan:    Initiate intravenous vancomycin with loading dose of 1500 mg once followed by a maintenance dose of vancomycin 1000 mg IV every 12 hours  Desired empiric serum trough concentration is 10 to 20 mcg/mL  Draw vancomycin trough level 30 min prior to fifth dose on 10/2/22 at approximately 1430  Pharmacy will continue to follow and monitor vancomycin.      Please contact pharmacy at extension [phone] with any questions regarding this assessment.     Thank you for the consult,   Lexis Dixon       Patient brief summary:  Roselyn Vieira is a 32 y.o. female initiated on antimicrobial therapy with IV Vancomycin for treatment of suspected skin & soft tissue infection    Drug Allergies:   Review of patient's allergies indicates:  No Known Allergies    Actual Body Weight:   59.9 kg    Renal Function:   Estimated Creatinine Clearance: 127.3 mL/min (based on SCr of 0.6 mg/dL).,     Dialysis Method (if applicable):  N/A    CBC (last 72 hours):  Recent Labs   Lab Result Units 09/29/22  1625 09/30/22  0554   WBC K/uL 15.20* 13.51*   Hemoglobin g/dL 12.2 10.6*   Hematocrit % 37.0 32.2*   Platelets K/uL 374 329   Gran % % 85.6* 80.8*   Lymph % % 7.0* 9.5*   Mono % % 6.3 8.0   Eosinophil % % 0.3 0.8   Basophil % % 0.3 0.2   Differential Method  Automated Automated       Metabolic Panel (last 72 hours):  Recent Labs   Lab Result Units 09/29/22  1625 09/30/22  0554   Sodium mmol/L 137 138   Potassium mmol/L 3.9 3.7   Chloride mmol/L 106 110   CO2 mmol/L 24 22*   Glucose mg/dL 79 116*   BUN mg/dL 10 5*   Creatinine mg/dL 0.8 0.6   Albumin g/dL 3.6 2.8*   Total Bilirubin mg/dL 0.4 0.3   Alkaline Phosphatase U/L 77 63   AST U/L 11 9*   ALT U/L 7* 6*       Drug levels (last 3 results):  No results for input(s): VANCOMYCINRA, VANCORANDOM, VANCOMYCINPE, VANCOPEAK, VANCOMYCINTR, VANCOTROUGH in the last 72 hours.    Microbiologic Results:  Microbiology Results (last 7  days)       Procedure Component Value Units Date/Time    Blood culture x two cultures. Draw prior to antibiotics. [[id number]] Collected: 09/29/22 1628    Order Status: Completed Specimen: Blood from Peripheral, Antecubital, Right Updated: 09/30/22 0315     Blood Culture, Routine No Growth to date    Narrative:      Aerobic and anaerobic    Blood culture x two cultures. Draw prior to antibiotics. [[id number]] Collected: 09/29/22 1631    Order Status: Completed Specimen: Blood from Wrist, Left Updated: 09/30/22 0315     Blood Culture, Routine No Growth to date    Narrative:      Aerobic and anaerobic

## 2022-09-30 NOTE — ED NOTES
Resumed pt care. 32 YOF presents to ED with c/o left hip pain X 4 day with no improvement. Redness and swelling noted to left hip . 0.45 NaCl infusing @100 ml/hr. A&Ox4. Denies any other compliants.     LOC: The patient is awake, alert and aware of environment with an appropriate affect, the patient is oriented x 3.  APPEARANCE: Patient resting comfortably and in no acute distress, patient is clean and well groomed, patient's clothing is properly fastened.  SKIN: The skin is warm and dry, patient has normal skin turgor and moist mucus membranes, skin intact, no breakdown or brusing noted.  MUSKULOSKELETAL: Patient moving all extremities well, no obvious swelling or deformities noted.  RESPIRATORY: Airway is open and patent, respirations are spontaneous, patient has a normal effort and rate.  CARDIAC: No peripheral edema.  ABDOMEN: Soft and + tenderness to palpation, no distention noted.     ED workup in progress. VSS. Safety measures in place; side rails up x2. Call light within pt reach. Will continue to monitor.

## 2022-10-01 LAB
ALBUMIN SERPL BCP-MCNC: 2.7 G/DL (ref 3.5–5.2)
ALP SERPL-CCNC: 67 U/L (ref 55–135)
ALT SERPL W/O P-5'-P-CCNC: 6 U/L (ref 10–44)
ANION GAP SERPL CALC-SCNC: 6 MMOL/L (ref 8–16)
AST SERPL-CCNC: 11 U/L (ref 10–40)
BASOPHILS # BLD AUTO: 0.02 K/UL (ref 0–0.2)
BASOPHILS NFR BLD: 0.1 % (ref 0–1.9)
BILIRUB SERPL-MCNC: 0.2 MG/DL (ref 0.1–1)
BUN SERPL-MCNC: 3 MG/DL (ref 6–20)
CALCIUM SERPL-MCNC: 8.5 MG/DL (ref 8.7–10.5)
CHLORIDE SERPL-SCNC: 108 MMOL/L (ref 95–110)
CO2 SERPL-SCNC: 21 MMOL/L (ref 23–29)
CREAT SERPL-MCNC: 0.7 MG/DL (ref 0.5–1.4)
DIFFERENTIAL METHOD: ABNORMAL
EOSINOPHIL # BLD AUTO: 0.1 K/UL (ref 0–0.5)
EOSINOPHIL NFR BLD: 0.4 % (ref 0–8)
ERYTHROCYTE [DISTWIDTH] IN BLOOD BY AUTOMATED COUNT: 13.1 % (ref 11.5–14.5)
EST. GFR  (NO RACE VARIABLE): >60 ML/MIN/1.73 M^2
GLUCOSE SERPL-MCNC: 136 MG/DL (ref 70–110)
HCT VFR BLD AUTO: 32.4 % (ref 37–48.5)
HGB BLD-MCNC: 10.9 G/DL (ref 12–16)
IMM GRANULOCYTES # BLD AUTO: 0.17 K/UL (ref 0–0.04)
IMM GRANULOCYTES NFR BLD AUTO: 1.2 % (ref 0–0.5)
LYMPHOCYTES # BLD AUTO: 1.3 K/UL (ref 1–4.8)
LYMPHOCYTES NFR BLD: 8.9 % (ref 18–48)
MCH RBC QN AUTO: 30.2 PG (ref 27–31)
MCHC RBC AUTO-ENTMCNC: 33.6 G/DL (ref 32–36)
MCV RBC AUTO: 90 FL (ref 82–98)
MONOCYTES # BLD AUTO: 1.2 K/UL (ref 0.3–1)
MONOCYTES NFR BLD: 8.4 % (ref 4–15)
NEUTROPHILS # BLD AUTO: 11.6 K/UL (ref 1.8–7.7)
NEUTROPHILS NFR BLD: 81 % (ref 38–73)
NRBC BLD-RTO: 0 /100 WBC
PLATELET # BLD AUTO: 371 K/UL (ref 150–450)
PMV BLD AUTO: 10 FL (ref 9.2–12.9)
POTASSIUM SERPL-SCNC: 3.5 MMOL/L (ref 3.5–5.1)
PROT SERPL-MCNC: 5.9 G/DL (ref 6–8.4)
RBC # BLD AUTO: 3.61 M/UL (ref 4–5.4)
SODIUM SERPL-SCNC: 135 MMOL/L (ref 136–145)
WBC # BLD AUTO: 14.32 K/UL (ref 3.9–12.7)

## 2022-10-01 PROCEDURE — 25000003 PHARM REV CODE 250: Performed by: SURGERY

## 2022-10-01 PROCEDURE — 99233 SBSQ HOSP IP/OBS HIGH 50: CPT | Mod: ,,, | Performed by: SURGERY

## 2022-10-01 PROCEDURE — 63600175 PHARM REV CODE 636 W HCPCS: Performed by: SURGERY

## 2022-10-01 PROCEDURE — G0378 HOSPITAL OBSERVATION PER HR: HCPCS

## 2022-10-01 PROCEDURE — 63600175 PHARM REV CODE 636 W HCPCS: Performed by: PHYSICIAN ASSISTANT

## 2022-10-01 PROCEDURE — 63600175 PHARM REV CODE 636 W HCPCS

## 2022-10-01 PROCEDURE — 96366 THER/PROPH/DIAG IV INF ADDON: CPT

## 2022-10-01 PROCEDURE — 96372 THER/PROPH/DIAG INJ SC/IM: CPT | Performed by: SURGERY

## 2022-10-01 PROCEDURE — 99233 PR SUBSEQUENT HOSPITAL CARE,LEVL III: ICD-10-PCS | Mod: ,,, | Performed by: SURGERY

## 2022-10-01 PROCEDURE — 85025 COMPLETE CBC W/AUTO DIFF WBC: CPT | Performed by: PHYSICIAN ASSISTANT

## 2022-10-01 PROCEDURE — 99226 PR SUBSEQUENT OBSERVATION CARE,LEVEL III: ICD-10-PCS | Mod: ,,, | Performed by: PHYSICIAN ASSISTANT

## 2022-10-01 PROCEDURE — 11000001 HC ACUTE MED/SURG PRIVATE ROOM

## 2022-10-01 PROCEDURE — 96372 THER/PROPH/DIAG INJ SC/IM: CPT

## 2022-10-01 PROCEDURE — 96376 TX/PRO/DX INJ SAME DRUG ADON: CPT

## 2022-10-01 PROCEDURE — 96361 HYDRATE IV INFUSION ADD-ON: CPT

## 2022-10-01 PROCEDURE — 25000003 PHARM REV CODE 250: Performed by: STUDENT IN AN ORGANIZED HEALTH CARE EDUCATION/TRAINING PROGRAM

## 2022-10-01 PROCEDURE — 63600175 PHARM REV CODE 636 W HCPCS: Performed by: STUDENT IN AN ORGANIZED HEALTH CARE EDUCATION/TRAINING PROGRAM

## 2022-10-01 PROCEDURE — 94761 N-INVAS EAR/PLS OXIMETRY MLT: CPT

## 2022-10-01 PROCEDURE — 99226 PR SUBSEQUENT OBSERVATION CARE,LEVEL III: CPT | Mod: ,,, | Performed by: PHYSICIAN ASSISTANT

## 2022-10-01 PROCEDURE — 96365 THER/PROPH/DIAG IV INF INIT: CPT | Mod: 59

## 2022-10-01 PROCEDURE — 80053 COMPREHEN METABOLIC PANEL: CPT | Performed by: PHYSICIAN ASSISTANT

## 2022-10-01 PROCEDURE — 87040 BLOOD CULTURE FOR BACTERIA: CPT | Performed by: PHYSICIAN ASSISTANT

## 2022-10-01 PROCEDURE — 25000003 PHARM REV CODE 250: Performed by: PHYSICIAN ASSISTANT

## 2022-10-01 PROCEDURE — 36415 COLL VENOUS BLD VENIPUNCTURE: CPT | Performed by: PHYSICIAN ASSISTANT

## 2022-10-01 PROCEDURE — 25000003 PHARM REV CODE 250

## 2022-10-01 RX ORDER — MORPHINE SULFATE 2 MG/ML
2 INJECTION, SOLUTION INTRAMUSCULAR; INTRAVENOUS EVERY 6 HOURS PRN
Status: DISCONTINUED | OUTPATIENT
Start: 2022-10-01 | End: 2022-10-04 | Stop reason: HOSPADM

## 2022-10-01 RX ORDER — HYDROCODONE BITARTRATE AND ACETAMINOPHEN 5; 325 MG/1; MG/1
1 TABLET ORAL EVERY 6 HOURS PRN
Status: DISCONTINUED | OUTPATIENT
Start: 2022-10-01 | End: 2022-10-01

## 2022-10-01 RX ORDER — HYDROCODONE BITARTRATE AND ACETAMINOPHEN 5; 325 MG/1; MG/1
1 TABLET ORAL EVERY 6 HOURS PRN
Status: DISCONTINUED | OUTPATIENT
Start: 2022-10-01 | End: 2022-10-04 | Stop reason: HOSPADM

## 2022-10-01 RX ADMIN — ENOXAPARIN SODIUM 40 MG: 100 INJECTION SUBCUTANEOUS at 05:10

## 2022-10-01 RX ADMIN — KETOROLAC TROMETHAMINE 30 MG: 30 INJECTION, SOLUTION INTRAMUSCULAR; INTRAVENOUS at 02:10

## 2022-10-01 RX ADMIN — QUETIAPINE FUMARATE 400 MG: 200 TABLET, EXTENDED RELEASE ORAL at 08:10

## 2022-10-01 RX ADMIN — MORPHINE SULFATE 2 MG: 2 INJECTION, SOLUTION INTRAMUSCULAR; INTRAVENOUS at 01:10

## 2022-10-01 RX ADMIN — SENNOSIDES AND DOCUSATE SODIUM 1 TABLET: 50; 8.6 TABLET ORAL at 09:10

## 2022-10-01 RX ADMIN — ACETAMINOPHEN 650 MG: 325 TABLET, FILM COATED ORAL at 12:10

## 2022-10-01 RX ADMIN — VANCOMYCIN HYDROCHLORIDE 1000 MG: 1 INJECTION, POWDER, LYOPHILIZED, FOR SOLUTION INTRAVENOUS at 02:10

## 2022-10-01 RX ADMIN — VANCOMYCIN HYDROCHLORIDE 1000 MG: 1 INJECTION, POWDER, LYOPHILIZED, FOR SOLUTION INTRAVENOUS at 03:10

## 2022-10-01 RX ADMIN — ACETAMINOPHEN 650 MG: 325 TABLET, FILM COATED ORAL at 08:10

## 2022-10-01 RX ADMIN — ONDANSETRON 4 MG: 2 INJECTION INTRAMUSCULAR; INTRAVENOUS at 10:10

## 2022-10-01 RX ADMIN — KETOROLAC TROMETHAMINE 30 MG: 30 INJECTION, SOLUTION INTRAMUSCULAR; INTRAVENOUS at 07:10

## 2022-10-01 RX ADMIN — MORPHINE SULFATE 2 MG: 2 INJECTION, SOLUTION INTRAMUSCULAR; INTRAVENOUS at 08:10

## 2022-10-01 RX ADMIN — SODIUM CHLORIDE: 0.45 INJECTION, SOLUTION INTRAVENOUS at 09:10

## 2022-10-01 RX ADMIN — CEFTRIAXONE 2 G: 2 INJECTION, SOLUTION INTRAVENOUS at 02:10

## 2022-10-01 RX ADMIN — ALUMINUM HYDROXIDE, MAGNESIUM HYDROXIDE, AND DIMETHICONE 30 ML: 200; 20; 200 SUSPENSION ORAL at 10:10

## 2022-10-01 RX ADMIN — POLYETHYLENE GLYCOL 3350 17 G: 17 POWDER, FOR SOLUTION ORAL at 05:10

## 2022-10-01 RX ADMIN — HYDROCODONE BITARTRATE AND ACETAMINOPHEN 1 TABLET: 5; 325 TABLET ORAL at 10:10

## 2022-10-01 RX ADMIN — ONDANSETRON 4 MG: 2 INJECTION INTRAMUSCULAR; INTRAVENOUS at 02:10

## 2022-10-01 RX ADMIN — ACETAMINOPHEN 650 MG: 325 TABLET, FILM COATED ORAL at 02:10

## 2022-10-01 RX ADMIN — SENNOSIDES AND DOCUSATE SODIUM 1 TABLET: 50; 8.6 TABLET ORAL at 08:10

## 2022-10-01 NOTE — SUBJECTIVE & OBJECTIVE
No current facility-administered medications on file prior to encounter.     Current Outpatient Medications on File Prior to Encounter   Medication Sig    clonazePAM (KLONOPIN) 1 MG tablet Take 1 mg by mouth nightly as needed.    QUEtiapine (SEROQUEL XR) 400 MG Tb24 Take 400 mg by mouth every evening.    VYVANSE 20 mg capsule Take 20 mg by mouth every morning.       Review of patient's allergies indicates:  No Known Allergies    Past Medical History:   Diagnosis Date    ADHD (attention deficit hyperactivity disorder)     Anxiety disorder, unspecified     Bipolar disorder, unspecified      History reviewed. No pertinent surgical history.  Family History       Problem Relation (Age of Onset)    No Known Problems Mother, Father, Sister, Brother          Tobacco Use    Smoking status: Every Day     Types: Vaping with nicotine    Smokeless tobacco: Never    Tobacco comments:     On nicotine replacement therapy   Substance and Sexual Activity    Alcohol use: Yes     Alcohol/week: 2.0 - 3.0 standard drinks     Types: 2 - 3 Cans of beer per week    Drug use: Yes     Types: Marijuana    Sexual activity: Not on file     Review of Systems   Constitutional:  Negative for appetite change, fatigue, fever and unexpected weight change.   HENT:  Negative for sore throat and trouble swallowing.    Eyes: Negative.    Respiratory:  Negative for cough, shortness of breath and wheezing.    Cardiovascular:  Negative for chest pain and leg swelling.   Gastrointestinal:  Negative for abdominal distention, abdominal pain, blood in stool, constipation, diarrhea, nausea and vomiting.   Endocrine: Negative.    Genitourinary: Negative.    Musculoskeletal:  Negative for back pain.        Gluteal tenderness   Skin: Negative.  Negative for rash.   Allergic/Immunologic: Negative.    Neurological: Negative.    Hematological: Negative.    Psychiatric/Behavioral:  Negative for confusion.    Objective:     Vital Signs (Most Recent):  Temp: 98.7 °F (37.1  °C) (10/01/22 1224)  Pulse: 92 (10/01/22 1224)  Resp: 16 (10/01/22 1357)  BP: 112/63 (10/01/22 1224)  SpO2: 98 % (10/01/22 1245) Vital Signs (24h Range):  Temp:  [98.1 °F (36.7 °C)-100.5 °F (38.1 °C)] 98.7 °F (37.1 °C)  Pulse:  [] 92  Resp:  [16-18] 16  SpO2:  [96 %-100 %] 98 %  BP: (112-128)/(62-73) 112/63     Weight: 58.9 kg (129 lb 13.6 oz)  Body mass index is 19.18 kg/m².    Physical Exam  Vitals and nursing note reviewed.   Constitutional:       Appearance: She is well-developed.   HENT:      Head: Normocephalic and atraumatic.   Cardiovascular:      Rate and Rhythm: Normal rate.      Heart sounds: Normal heart sounds.   Pulmonary:      Effort: Pulmonary effort is normal.   Abdominal:      General: Bowel sounds are normal. There is no distension.      Palpations: Abdomen is soft.      Tenderness: There is no abdominal tenderness.   Musculoskeletal:         General: Normal range of motion.      Cervical back: Normal range of motion.   Skin:     General: Skin is warm and dry.      Capillary Refill: Capillary refill takes less than 2 seconds.      Comments: Bruising and erythema (left back, area of cellulitis; erythema    Neurological:      Mental Status: She is alert and oriented to person, place, and time.   Psychiatric:         Behavior: Behavior normal.       Significant Labs:  I have reviewed all pertinent lab results within the past 24 hours.  CBC:   Recent Labs   Lab 10/01/22  0451   WBC 14.32*   RBC 3.61*   HGB 10.9*   HCT 32.4*      MCV 90   MCH 30.2   MCHC 33.6     CMP:   Recent Labs   Lab 10/01/22  0451   *   CALCIUM 8.5*   ALBUMIN 2.7*   PROT 5.9*   *   K 3.5   CO2 21*      BUN 3*   CREATININE 0.7   ALKPHOS 67   ALT 6*   AST 11   BILITOT 0.2       Significant Diagnostics:  I have reviewed all pertinent imaging results/findings within the past 24 hours.  MRI shows concern for a 5 x 3 cm abscess

## 2022-10-01 NOTE — SUBJECTIVE & OBJECTIVE
Interval History: Continues to be febrile and with elevated WBC. MRI with concerns for possible abscess formation, will ask surgery to evaluate. Appears clinically improving. Adjusting pain regimen    Review of Systems   Constitutional:  Positive for chills and fever.   HENT:  Negative for congestion and sore throat.    Eyes:  Negative for pain and redness.   Respiratory:  Negative for chest tightness and shortness of breath.    Cardiovascular:  Negative for chest pain and leg swelling.   Gastrointestinal:  Negative for abdominal distention, abdominal pain, diarrhea, nausea and vomiting.   Genitourinary:  Negative for difficulty urinating.   Musculoskeletal:  Positive for gait problem (walks with a limp due to left hip pain). Negative for joint swelling.   Skin:  Negative for rash and wound.   Neurological:  Negative for weakness and headaches.   Psychiatric/Behavioral:  Negative for agitation and behavioral problems.    Objective:     Vital Signs (Most Recent):  Temp: 98.1 °F (36.7 °C) (10/01/22 0724)  Pulse: 91 (10/01/22 0724)  Resp: 16 (10/01/22 0724)  BP: 117/63 (10/01/22 0724)  SpO2: 99 % (10/01/22 0724) Vital Signs (24h Range):  Temp:  [98.1 °F (36.7 °C)-100.5 °F (38.1 °C)] 98.1 °F (36.7 °C)  Pulse:  [] 91  Resp:  [16-18] 16  SpO2:  [96 %-100 %] 99 %  BP: (113-128)/(59-73) 117/63     Weight: 58.9 kg (129 lb 13.6 oz)  Body mass index is 19.18 kg/m².    Intake/Output Summary (Last 24 hours) at 10/1/2022 1029  Last data filed at 10/1/2022 0414  Gross per 24 hour   Intake 360 ml   Output --   Net 360 ml      Physical Exam  Vitals and nursing note reviewed.   Constitutional:       Appearance: She is not ill-appearing.   HENT:      Head: Normocephalic and atraumatic.      Nose: No congestion or rhinorrhea.   Cardiovascular:      Rate and Rhythm: Normal rate and regular rhythm.   Pulmonary:      Effort: Pulmonary effort is normal.      Breath sounds: Normal breath sounds.   Abdominal:      General: There is no  distension.      Palpations: Abdomen is soft.      Comments:      Musculoskeletal:         General: Swelling (left hip) and tenderness (left area near the hip joint) present.      Cervical back: Normal range of motion and neck supple.      Right lower leg: No edema.      Left lower leg: No edema.   Skin:     General: Skin is warm and dry.      Findings: Bruising (left back, area of cellulitis, see images) and erythema (left back, area of cellulitis; erythema improved since yesterday) present.   Neurological:      General: No focal deficit present.      Mental Status: She is alert.   Psychiatric:         Mood and Affect: Mood normal.         Behavior: Behavior normal.       Significant Labs: All pertinent labs within the past 24 hours have been reviewed.    Significant Imaging: I have reviewed all pertinent imaging results/findings within the past 24 hours.

## 2022-10-01 NOTE — CONSULTS
[facility] ([place])  General Surgery  Consult Note    Patient Name: Roselyn Vieira  MRN: [id number]  Code Status: Full Code  Admission Date: 9/29/2022  Hospital Length of Stay: 0 days  Attending Physician: Mauricio Montero MD  Primary Care Provider: Primary Doctor No    Patient information was obtained from patient and ER records.     Inpatient consult to General Surgery  Consult performed by: Demar Raymundo MD  Consult ordered by: Halley Castle PA-C  Reason for consult: left hip abscess/cellulitis  Assessment/Recommendations: Recommend incision and drainage   NPO at midnight   Continue IV antibiotics         Subjective:     Principal Problem: Cellulitis of back except buttock    History of Present Illness: 32 year old female with previous smoking history, ADHD presenting with 3 day onset of left hip cellulitis with associated fever, chills and worsening pain and erythema. Patient denies remembering any insect bite or trauma to left hip, but would be a possibility of getting it as she is outdoors most of the time with her dog and working as a . Left hip soreness started 3 days PTA, then fever Tmax 102 the next day and increasing area of erythema 1 day PTA with limitations to ambulation due to severe pain. Patient reports walking with a limp due to pain. Patient denies headache, nausea, vomiting, diarrhea, chest pain, S      No current facility-administered medications on file prior to encounter.     Current Outpatient Medications on File Prior to Encounter   Medication Sig    clonazePAM (KLONOPIN) 1 MG tablet Take 1 mg by mouth nightly as needed.    QUEtiapine (SEROQUEL XR) 400 MG Tb24 Take 400 mg by mouth every evening.    VYVANSE 20 mg capsule Take 20 mg by mouth every morning.       Review of patient's allergies indicates:  No Known Allergies    Past Medical History:   Diagnosis Date    ADHD (attention deficit hyperactivity disorder)     Anxiety disorder, unspecified      Bipolar disorder, unspecified      History reviewed. No pertinent surgical history.  Family History       Problem Relation (Age of Onset)    No Known Problems Mother, Father, Sister, Brother          Tobacco Use    Smoking status: Every Day     Types: Vaping with nicotine    Smokeless tobacco: Never    Tobacco comments:     On nicotine replacement therapy   Substance and Sexual Activity    Alcohol use: Yes     Alcohol/week: 2.0 - 3.0 standard drinks     Types: 2 - 3 Cans of beer per week    Drug use: Yes     Types: Marijuana    Sexual activity: Not on file     Review of Systems   Constitutional:  Negative for appetite change, fatigue, fever and unexpected weight change.   HENT:  Negative for sore throat and trouble swallowing.    Eyes: Negative.    Respiratory:  Negative for cough, shortness of breath and wheezing.    Cardiovascular:  Negative for chest pain and leg swelling.   Gastrointestinal:  Negative for abdominal distention, abdominal pain, blood in stool, constipation, diarrhea, nausea and vomiting.   Endocrine: Negative.    Genitourinary: Negative.    Musculoskeletal:  Negative for back pain.        Gluteal tenderness   Skin: Negative.  Negative for rash.   Allergic/Immunologic: Negative.    Neurological: Negative.    Hematological: Negative.    Psychiatric/Behavioral:  Negative for confusion.    Objective:     Vital Signs (Most Recent):  Temp: 98.7 °F (37.1 °C) (10/01/22 1224)  Pulse: 92 (10/01/22 1224)  Resp: 16 (10/01/22 1357)  BP: 112/63 (10/01/22 1224)  SpO2: 98 % (10/01/22 1245) Vital Signs (24h Range):  Temp:  [98.1 °F (36.7 °C)-100.5 °F (38.1 °C)] 98.7 °F (37.1 °C)  Pulse:  [] 92  Resp:  [16-18] 16  SpO2:  [96 %-100 %] 98 %  BP: (112-128)/(62-73) 112/63     Weight: 58.9 kg (129 lb 13.6 oz)  Body mass index is 19.18 kg/m².    Physical Exam  Vitals and nursing note reviewed.   Constitutional:       Appearance: She is well-developed.   HENT:      Head: Normocephalic and atraumatic.    Cardiovascular:      Rate and Rhythm: Normal rate.      Heart sounds: Normal heart sounds.   Pulmonary:      Effort: Pulmonary effort is normal.   Abdominal:      General: Bowel sounds are normal. There is no distension.      Palpations: Abdomen is soft.      Tenderness: There is no abdominal tenderness.   Musculoskeletal:         General: Normal range of motion.      Cervical back: Normal range of motion.   Skin:     General: Skin is warm and dry.      Capillary Refill: Capillary refill takes less than 2 seconds.      Comments: Bruising and erythema (left back, area of cellulitis; erythema    Neurological:      Mental Status: She is alert and oriented to person, place, and time.   Psychiatric:         Behavior: Behavior normal.       Significant Labs:  I have reviewed all pertinent lab results within the past 24 hours.  CBC:   Recent Labs   Lab 10/01/22  0451   WBC 14.32*   RBC 3.61*   HGB 10.9*   HCT 32.4*      MCV 90   MCH 30.2   MCHC 33.6     CMP:   Recent Labs   Lab 10/01/22  0451   *   CALCIUM 8.5*   ALBUMIN 2.7*   PROT 5.9*   *   K 3.5   CO2 21*      BUN 3*   CREATININE 0.7   ALKPHOS 67   ALT 6*   AST 11   BILITOT 0.2       Significant Diagnostics:  I have reviewed all pertinent imaging results/findings within the past 24 hours.  MRI shows concern for a 5 x 3 cm abscess      Assessment/Plan:     * Cellulitis of back except buttock  Recommend incision and drainage in OR  Patient is not NPO right now so likely plan for tomorrow morning  All risks and benefits discussed         VTE Risk Mitigation (From admission, onward)         Ordered     enoxaparin injection 40 mg  Daily         09/30/22 1541     IP VTE HIGH RISK PATIENT  Once         09/30/22 1541     Place sequential compression device  Until discontinued         09/30/22 1541     Place ANDREW hose  Until discontinued         09/29/22 1817                Thank you for your consult. I will follow-up with patient. Please contact us  if you have any additional questions.    Demar Raymundo MD  General Surgery  [facility] ([place])

## 2022-10-01 NOTE — HPI
32 year old female with previous smoking history, ADHD presenting with 3 day onset of left hip cellulitis with associated fever, chills and worsening pain and erythema. Patient denies remembering any insect bite or trauma to left hip, but would be a possibility of getting it as she is outdoors most of the time with her dog and working as a . Left hip soreness started 3 days PTA, then fever Tmax 102 the next day and increasing area of erythema 1 day PTA with limitations to ambulation due to severe pain. Patient reports walking with a limp due to pain. Patient denies headache, nausea, vomiting, diarrhea, chest pain, S

## 2022-10-01 NOTE — ASSESSMENT & PLAN NOTE
Previous 1 pack a day smoker of ?years, currently vapes   Counseled to quit, amenable; spoke with patient for 3 min    - Continue nicotine replacement therapy 21mg/24hr

## 2022-10-01 NOTE — H&P (VIEW-ONLY)
[facility] ([place])  General Surgery  Consult Note    Patient Name: Roselyn Vieira  MRN: [id number]  Code Status: Full Code  Admission Date: 9/29/2022  Hospital Length of Stay: 0 days  Attending Physician: Mauricio Monteor MD  Primary Care Provider: Primary Doctor No    Patient information was obtained from patient and ER records.     Inpatient consult to General Surgery  Consult performed by: Demar Raymundo MD  Consult ordered by: Halley Castle PA-C  Reason for consult: left hip abscess/cellulitis  Assessment/Recommendations: Recommend incision and drainage   NPO at midnight   Continue IV antibiotics         Subjective:     Principal Problem: Cellulitis of back except buttock    History of Present Illness: 32 year old female with previous smoking history, ADHD presenting with 3 day onset of left hip cellulitis with associated fever, chills and worsening pain and erythema. Patient denies remembering any insect bite or trauma to left hip, but would be a possibility of getting it as she is outdoors most of the time with her dog and working as a . Left hip soreness started 3 days PTA, then fever Tmax 102 the next day and increasing area of erythema 1 day PTA with limitations to ambulation due to severe pain. Patient reports walking with a limp due to pain. Patient denies headache, nausea, vomiting, diarrhea, chest pain, S      No current facility-administered medications on file prior to encounter.     Current Outpatient Medications on File Prior to Encounter   Medication Sig    clonazePAM (KLONOPIN) 1 MG tablet Take 1 mg by mouth nightly as needed.    QUEtiapine (SEROQUEL XR) 400 MG Tb24 Take 400 mg by mouth every evening.    VYVANSE 20 mg capsule Take 20 mg by mouth every morning.       Review of patient's allergies indicates:  No Known Allergies    Past Medical History:   Diagnosis Date    ADHD (attention deficit hyperactivity disorder)     Anxiety disorder, unspecified      Bipolar disorder, unspecified      History reviewed. No pertinent surgical history.  Family History       Problem Relation (Age of Onset)    No Known Problems Mother, Father, Sister, Brother          Tobacco Use    Smoking status: Every Day     Types: Vaping with nicotine    Smokeless tobacco: Never    Tobacco comments:     On nicotine replacement therapy   Substance and Sexual Activity    Alcohol use: Yes     Alcohol/week: 2.0 - 3.0 standard drinks     Types: 2 - 3 Cans of beer per week    Drug use: Yes     Types: Marijuana    Sexual activity: Not on file     Review of Systems   Constitutional:  Negative for appetite change, fatigue, fever and unexpected weight change.   HENT:  Negative for sore throat and trouble swallowing.    Eyes: Negative.    Respiratory:  Negative for cough, shortness of breath and wheezing.    Cardiovascular:  Negative for chest pain and leg swelling.   Gastrointestinal:  Negative for abdominal distention, abdominal pain, blood in stool, constipation, diarrhea, nausea and vomiting.   Endocrine: Negative.    Genitourinary: Negative.    Musculoskeletal:  Negative for back pain.        Gluteal tenderness   Skin: Negative.  Negative for rash.   Allergic/Immunologic: Negative.    Neurological: Negative.    Hematological: Negative.    Psychiatric/Behavioral:  Negative for confusion.    Objective:     Vital Signs (Most Recent):  Temp: 98.7 °F (37.1 °C) (10/01/22 1224)  Pulse: 92 (10/01/22 1224)  Resp: 16 (10/01/22 1357)  BP: 112/63 (10/01/22 1224)  SpO2: 98 % (10/01/22 1245) Vital Signs (24h Range):  Temp:  [98.1 °F (36.7 °C)-100.5 °F (38.1 °C)] 98.7 °F (37.1 °C)  Pulse:  [] 92  Resp:  [16-18] 16  SpO2:  [96 %-100 %] 98 %  BP: (112-128)/(62-73) 112/63     Weight: 58.9 kg (129 lb 13.6 oz)  Body mass index is 19.18 kg/m².    Physical Exam  Vitals and nursing note reviewed.   Constitutional:       Appearance: She is well-developed.   HENT:      Head: Normocephalic and atraumatic.    Cardiovascular:      Rate and Rhythm: Normal rate.      Heart sounds: Normal heart sounds.   Pulmonary:      Effort: Pulmonary effort is normal.   Abdominal:      General: Bowel sounds are normal. There is no distension.      Palpations: Abdomen is soft.      Tenderness: There is no abdominal tenderness.   Musculoskeletal:         General: Normal range of motion.      Cervical back: Normal range of motion.   Skin:     General: Skin is warm and dry.      Capillary Refill: Capillary refill takes less than 2 seconds.      Comments: Bruising and erythema (left back, area of cellulitis; erythema    Neurological:      Mental Status: She is alert and oriented to person, place, and time.   Psychiatric:         Behavior: Behavior normal.       Significant Labs:  I have reviewed all pertinent lab results within the past 24 hours.  CBC:   Recent Labs   Lab 10/01/22  0451   WBC 14.32*   RBC 3.61*   HGB 10.9*   HCT 32.4*      MCV 90   MCH 30.2   MCHC 33.6     CMP:   Recent Labs   Lab 10/01/22  0451   *   CALCIUM 8.5*   ALBUMIN 2.7*   PROT 5.9*   *   K 3.5   CO2 21*      BUN 3*   CREATININE 0.7   ALKPHOS 67   ALT 6*   AST 11   BILITOT 0.2       Significant Diagnostics:  I have reviewed all pertinent imaging results/findings within the past 24 hours.  MRI shows concern for a 5 x 3 cm abscess      Assessment/Plan:     * Cellulitis of back except buttock  Recommend incision and drainage in OR  Patient is not NPO right now so likely plan for tomorrow morning  All risks and benefits discussed         VTE Risk Mitigation (From admission, onward)         Ordered     enoxaparin injection 40 mg  Daily         09/30/22 1541     IP VTE HIGH RISK PATIENT  Once         09/30/22 1541     Place sequential compression device  Until discontinued         09/30/22 1541     Place ANDREW hose  Until discontinued         09/29/22 7743                Thank you for your consult. I will follow-up with patient. Please contact us  if you have any additional questions.    Demar Raymundo MD  General Surgery  [facility] ([place])

## 2022-10-01 NOTE — PLAN OF CARE
POC reviewed with patient. AAOx4. VS stable on room air. Complaints of pain treated with PRN pain medication according to MAR. No other complaints verbalized during shift. IV fluids infusing. No injuries, falls, or trauma occurred during shift. Purposeful rounding completed. Bed low and locked with side rails up x 3 and call light within reach.

## 2022-10-01 NOTE — PROGRESS NOTES
[facility] ([facility]  Progress Note    Patient Name: Roselyn Vieira  MRN: [id number]  Patient Class: OP- Observation   Admission Date: 9/29/2022  Length of Stay: 0 days  Attending Physician: Mauricio Montero MD  Primary Care Provider: Primary Doctor No        Subjective:     Principal Problem:Cellulitis of back except buttock        HPI:  Roselyn Vieira is a 32 year old female with previous smoking history, ADHD presenting with 3 day onset of left hip cellulitis with associated fever, chills and worsening pain and erythema. Patient denies remembering any insect bite or trauma to left hip, but would be a possibility of getting it as she is outdoors most of the time with her dog and working as a . Left hip soreness started 3 days PTA, then fever Tmax 102 the next day and increasing area of erythema 1 day PTA with limitations to ambulation due to severe pain. Patient reports walking with a limp due to pain. Patient denies headache, nausea, vomiting, diarrhea, chest pain, SOB. Patient also denies recent dental work, hx of diabetes nor is she on long term steroids.    HDS with Tmax 100.8. Leukocytosis 15.2 with elevated .3 and lactate 2.0. Started on IV Clindamycin 600mg q8. US done at ER and shown no fluid collection. Despite downtrending WBC to 13.51 today, patient reports persistent pain to left hip with increased erythema (see images).     Patient is upgraded to Observation status and will be managed by Hospital Medicine.       Overview/Hospital Course:  Roselyn Vieira was admitted for cellulitis of her L hip. Febrile and with leukocytosis on admission. Started on Clindamycin but did not improve, switched to IV Vanc and CTX. MRI pelvis with possible fluid formation, surgery consulted to eval. Bcx with GPR, repeat ordered.         Interval History: Continues to be febrile and with elevated WBC. MRI with concerns for possible abscess formation, will ask surgery to evaluate.  Appears clinically improving. Adjusting pain regimen    Review of Systems   Constitutional:  Positive for chills and fever.   HENT:  Negative for congestion and sore throat.    Eyes:  Negative for pain and redness.   Respiratory:  Negative for chest tightness and shortness of breath.    Cardiovascular:  Negative for chest pain and leg swelling.   Gastrointestinal:  Negative for abdominal distention, abdominal pain, diarrhea, nausea and vomiting.   Genitourinary:  Negative for difficulty urinating.   Musculoskeletal:  Positive for gait problem (walks with a limp due to left hip pain). Negative for joint swelling.   Skin:  Negative for rash and wound.   Neurological:  Negative for weakness and headaches.   Psychiatric/Behavioral:  Negative for agitation and behavioral problems.    Objective:     Vital Signs (Most Recent):  Temp: 98.1 °F (36.7 °C) (10/01/22 0724)  Pulse: 91 (10/01/22 0724)  Resp: 16 (10/01/22 0724)  BP: 117/63 (10/01/22 0724)  SpO2: 99 % (10/01/22 0724) Vital Signs (24h Range):  Temp:  [98.1 °F (36.7 °C)-100.5 °F (38.1 °C)] 98.1 °F (36.7 °C)  Pulse:  [] 91  Resp:  [16-18] 16  SpO2:  [96 %-100 %] 99 %  BP: (113-128)/(59-73) 117/63     Weight: 58.9 kg (129 lb 13.6 oz)  Body mass index is 19.18 kg/m².    Intake/Output Summary (Last 24 hours) at 10/1/2022 1029  Last data filed at 10/1/2022 0414  Gross per 24 hour   Intake 360 ml   Output --   Net 360 ml      Physical Exam  Vitals and nursing note reviewed.   Constitutional:       Appearance: She is not ill-appearing.   HENT:      Head: Normocephalic and atraumatic.      Nose: No congestion or rhinorrhea.   Cardiovascular:      Rate and Rhythm: Normal rate and regular rhythm.   Pulmonary:      Effort: Pulmonary effort is normal.      Breath sounds: Normal breath sounds.   Abdominal:      General: There is no distension.      Palpations: Abdomen is soft.      Comments:      Musculoskeletal:         General: Swelling (left hip) and tenderness (left area  near the hip joint) present.      Cervical back: Normal range of motion and neck supple.      Right lower leg: No edema.      Left lower leg: No edema.   Skin:     General: Skin is warm and dry.      Findings: Bruising (left back, area of cellulitis, see images) and erythema (left back, area of cellulitis; erythema improved since yesterday) present.   Neurological:      General: No focal deficit present.      Mental Status: She is alert.   Psychiatric:         Mood and Affect: Mood normal.         Behavior: Behavior normal.       Significant Labs: All pertinent labs within the past 24 hours have been reviewed.    Significant Imaging: I have reviewed all pertinent imaging results/findings within the past 24 hours.      Assessment/Plan:      * Cellulitis of back except buttock  - Fever, chills and worsening pain and erythema for 3 days, worsening, with Tmax 102 at home and associated limitation to ambulation due to pain. Denies insect bite or trauma to left hip, denies recent dental work, hx of diabetes nor is she on long term steroids.   - HDS with Tmax 100.8 and tachycardia 103 . Leukocytosis 15.2 with elevated .3 and lactate 2.0. Started on IV Clindamycin 600mg q8. US done at ER and shown no fluid collection. Despite downtrending WBC to 13.51 today, patient reports persistent pain to left hip with increased erythema (see images).   - Extremely tender to touch, 10/10 with movement, no drainage, ROM to left hip limited by pain.  - Hypocalcemia 7.9, corrected for albumin - 8.5     Plan:  - MRI left hip without bone involvement, however appears they may be fluid collection forming  - surgery eval to assess fluid formation  - Blood cultures from admit growing GPR, will repeat  - Broaden abx cover to IV Ceftriaxone 2g qd and IV Vancomycin as per pharmacy- improving clinically      ADHD  Continue home dose lisdexamfetamine 20mg qd      Bipolar disorder  Resume home dose quetiapine 400mg qd    Nicotine dependence  due to vaping tobacco product  Previous 1 pack a day smoker of ?years, currently vapes   Counseled to quit, amenable; spoke with patient for 3 min    - Continue nicotine replacement therapy 21mg/24hr        VTE Risk Mitigation (From admission, onward)         Ordered     enoxaparin injection 40 mg  Daily         09/30/22 1541     IP VTE HIGH RISK PATIENT  Once         09/30/22 1541     Place sequential compression device  Until discontinued         09/30/22 1541     Place ANDREW hose  Until discontinued         09/29/22 1756                Discharge Planning   BOBBY:      Code Status: Full Code   Is the patient medically ready for discharge?:     Reason for patient still in hospital (select all that apply): Laboratory test, Treatment and Consult recommendations  Discharge Plan A: Home                  Halley Castle PA-C  Department of Hospital Medicine   [facility] ([place])

## 2022-10-01 NOTE — PLAN OF CARE
"PCP can not recall name but states "at [facility] in [facility]" - no record in patient station?  Pharmacy 1)[facility] 2)[facility]    Independent - friend will provide transportation home        10/01/22 0845   Discharge Assessment   Assessment Type Discharge Planning Assessment   Confirmed/corrected address, phone number and insurance Yes   Confirmed Demographics   (corrected in EPIC)   Source of Information patient   Lives With friend(s)   Do you expect to return to your current living situation? Yes   Do you have help at home or someone to help you manage your care at home? Yes   Prior to hospitilization cognitive status: Alert/Oriented   Current cognitive status: Alert/Oriented   Walking or Climbing Stairs Difficulty none   Dressing/Bathing Difficulty none   Equipment Currently Used at Home none   Do you take prescription medications? Yes   Do you have prescription coverage? Yes   Do you have any problems affording any of your prescribed medications? No   Is the patient taking medications as prescribed? yes   Who is going to help you get home at discharge? friend   How do you get to doctors appointments? family or friend will provide   Discharge Plan A Home   DME Needed Upon Discharge  none   Discharge Plan discussed with: Patient   Discharge Barriers Identified None     "

## 2022-10-01 NOTE — PROGRESS NOTES
Pharmacy to dose Vancomycin:    Patient reviewed, renal function stable, cultures reviewed, no new levels, continue current therapy; Next levels due: 10/2/22 @ 1430.      No urine output charted.     Thank you, Lindsey Albarran, Pharm.D.

## 2022-10-01 NOTE — HOSPITAL COURSE
Roselyn Vieira was admitted for cellulitis of her L hip. Febrile and with leukocytosis on admission. Started on Clindamycin but did not improve, switched to IV Vanc and CTX. MRI pelvis with possible fluid formation, surgery consulted, taken to OR 10/2 for I&D and penrose drain placement (left in place, f/u with surgery to remove), follow cultures. Bcx on admission grew bacillus in one bottle, repeats NGTD. Suspected contaminant  Patient stable for dc with augmentin and doxy, surgery follow up. Return precautions discussed, no further questions at dc

## 2022-10-01 NOTE — ASSESSMENT & PLAN NOTE
- Fever, chills and worsening pain and erythema for 3 days, worsening, with Tmax 102 at home and associated limitation to ambulation due to pain. Denies insect bite or trauma to left hip, denies recent dental work, hx of diabetes nor is she on long term steroids.   - HDS with Tmax 100.8 and tachycardia 103 . Leukocytosis 15.2 with elevated .3 and lactate 2.0. Started on IV Clindamycin 600mg q8. US done at ER and shown no fluid collection. Despite downtrending WBC to 13.51 today, patient reports persistent pain to left hip with increased erythema (see images).   - Extremely tender to touch, 10/10 with movement, no drainage, ROM to left hip limited by pain.  - Hypocalcemia 7.9, corrected for albumin - 8.5     Plan:  - MRI left hip without bone involvement, however appears they may be fluid collection forming  - surgery eval to assess fluid formation  - Blood cultures from admit growing GPR, will repeat  - Broaden abx cover to IV Ceftriaxone 2g qd and IV Vancomycin as per pharmacy- improving clinically

## 2022-10-01 NOTE — ASSESSMENT & PLAN NOTE
Recommend incision and drainage in OR  Patient is not NPO right now so likely plan for tomorrow morning  All risks and benefits discussed

## 2022-10-02 ENCOUNTER — ANESTHESIA EVENT (OUTPATIENT)
Dept: SURGERY | Facility: OTHER | Age: 32
DRG: 603 | End: 2022-10-02
Payer: MEDICAID

## 2022-10-02 ENCOUNTER — ANESTHESIA (OUTPATIENT)
Dept: SURGERY | Facility: OTHER | Age: 32
DRG: 603 | End: 2022-10-02
Payer: MEDICAID

## 2022-10-02 LAB
ALBUMIN SERPL BCP-MCNC: 2.7 G/DL (ref 3.5–5.2)
ALP SERPL-CCNC: 77 U/L (ref 55–135)
ALT SERPL W/O P-5'-P-CCNC: 7 U/L (ref 10–44)
ANION GAP SERPL CALC-SCNC: 6 MMOL/L (ref 8–16)
AST SERPL-CCNC: 14 U/L (ref 10–40)
BASOPHILS # BLD AUTO: 0.04 K/UL (ref 0–0.2)
BASOPHILS NFR BLD: 0.2 % (ref 0–1.9)
BILIRUB SERPL-MCNC: 0.3 MG/DL (ref 0.1–1)
BUN SERPL-MCNC: 5 MG/DL (ref 6–20)
CALCIUM SERPL-MCNC: 8.6 MG/DL (ref 8.7–10.5)
CHLORIDE SERPL-SCNC: 107 MMOL/L (ref 95–110)
CO2 SERPL-SCNC: 22 MMOL/L (ref 23–29)
CREAT SERPL-MCNC: 0.7 MG/DL (ref 0.5–1.4)
DIFFERENTIAL METHOD: ABNORMAL
EOSINOPHIL # BLD AUTO: 0.1 K/UL (ref 0–0.5)
EOSINOPHIL NFR BLD: 0.5 % (ref 0–8)
ERYTHROCYTE [DISTWIDTH] IN BLOOD BY AUTOMATED COUNT: 13.2 % (ref 11.5–14.5)
EST. GFR  (NO RACE VARIABLE): >60 ML/MIN/1.73 M^2
GLUCOSE SERPL-MCNC: 111 MG/DL (ref 70–110)
HCT VFR BLD AUTO: 32.6 % (ref 37–48.5)
HGB BLD-MCNC: 11.1 G/DL (ref 12–16)
IMM GRANULOCYTES # BLD AUTO: 0.19 K/UL (ref 0–0.04)
IMM GRANULOCYTES NFR BLD AUTO: 1.1 % (ref 0–0.5)
LYMPHOCYTES # BLD AUTO: 1.6 K/UL (ref 1–4.8)
LYMPHOCYTES NFR BLD: 9.4 % (ref 18–48)
MCH RBC QN AUTO: 30.4 PG (ref 27–31)
MCHC RBC AUTO-ENTMCNC: 34 G/DL (ref 32–36)
MCV RBC AUTO: 89 FL (ref 82–98)
MONOCYTES # BLD AUTO: 1.5 K/UL (ref 0.3–1)
MONOCYTES NFR BLD: 8.7 % (ref 4–15)
NEUTROPHILS # BLD AUTO: 13.6 K/UL (ref 1.8–7.7)
NEUTROPHILS NFR BLD: 80.1 % (ref 38–73)
NRBC BLD-RTO: 0 /100 WBC
PLATELET # BLD AUTO: 409 K/UL (ref 150–450)
PMV BLD AUTO: 9.7 FL (ref 9.2–12.9)
POTASSIUM SERPL-SCNC: 3.6 MMOL/L (ref 3.5–5.1)
PROT SERPL-MCNC: 6 G/DL (ref 6–8.4)
RBC # BLD AUTO: 3.65 M/UL (ref 4–5.4)
SODIUM SERPL-SCNC: 135 MMOL/L (ref 136–145)
VANCOMYCIN TROUGH SERPL-MCNC: 6.7 UG/ML (ref 10–22)
WBC # BLD AUTO: 16.94 K/UL (ref 3.9–12.7)

## 2022-10-02 PROCEDURE — 25000003 PHARM REV CODE 250: Performed by: SURGERY

## 2022-10-02 PROCEDURE — 25000003 PHARM REV CODE 250: Performed by: STUDENT IN AN ORGANIZED HEALTH CARE EDUCATION/TRAINING PROGRAM

## 2022-10-02 PROCEDURE — 94799 UNLISTED PULMONARY SVC/PX: CPT

## 2022-10-02 PROCEDURE — 85025 COMPLETE CBC W/AUTO DIFF WBC: CPT | Performed by: PHYSICIAN ASSISTANT

## 2022-10-02 PROCEDURE — 63600175 PHARM REV CODE 636 W HCPCS: Performed by: STUDENT IN AN ORGANIZED HEALTH CARE EDUCATION/TRAINING PROGRAM

## 2022-10-02 PROCEDURE — 63600175 PHARM REV CODE 636 W HCPCS: Performed by: PHYSICIAN ASSISTANT

## 2022-10-02 PROCEDURE — 87075 CULTR BACTERIA EXCEPT BLOOD: CPT | Performed by: SURGERY

## 2022-10-02 PROCEDURE — 80053 COMPREHEN METABOLIC PANEL: CPT | Performed by: PHYSICIAN ASSISTANT

## 2022-10-02 PROCEDURE — 63600175 PHARM REV CODE 636 W HCPCS: Performed by: SURGERY

## 2022-10-02 PROCEDURE — 63600175 PHARM REV CODE 636 W HCPCS: Performed by: ANESTHESIOLOGY

## 2022-10-02 PROCEDURE — 36000704 HC OR TIME LEV I 1ST 15 MIN: Performed by: SURGERY

## 2022-10-02 PROCEDURE — 37000008 HC ANESTHESIA 1ST 15 MINUTES: Performed by: SURGERY

## 2022-10-02 PROCEDURE — 10061 I&D ABSCESS COMP/MULTIPLE: CPT | Mod: ,,, | Performed by: SURGERY

## 2022-10-02 PROCEDURE — 96376 TX/PRO/DX INJ SAME DRUG ADON: CPT

## 2022-10-02 PROCEDURE — 36000705 HC OR TIME LEV I EA ADD 15 MIN: Performed by: SURGERY

## 2022-10-02 PROCEDURE — 87076 CULTURE ANAEROBE IDENT EACH: CPT | Performed by: SURGERY

## 2022-10-02 PROCEDURE — 99226 PR SUBSEQUENT OBSERVATION CARE,LEVEL III: ICD-10-PCS | Mod: ,,, | Performed by: PHYSICIAN ASSISTANT

## 2022-10-02 PROCEDURE — 10061 PR DRAIN SKIN ABSCESS COMPLIC: ICD-10-PCS | Mod: ,,, | Performed by: SURGERY

## 2022-10-02 PROCEDURE — 87116 MYCOBACTERIA CULTURE: CPT | Performed by: SURGERY

## 2022-10-02 PROCEDURE — 11000001 HC ACUTE MED/SURG PRIVATE ROOM

## 2022-10-02 PROCEDURE — 37000009 HC ANESTHESIA EA ADD 15 MINS: Performed by: SURGERY

## 2022-10-02 PROCEDURE — 94761 N-INVAS EAR/PLS OXIMETRY MLT: CPT

## 2022-10-02 PROCEDURE — 36415 COLL VENOUS BLD VENIPUNCTURE: CPT | Performed by: PHYSICIAN ASSISTANT

## 2022-10-02 PROCEDURE — 87070 CULTURE OTHR SPECIMN AEROBIC: CPT | Performed by: SURGERY

## 2022-10-02 PROCEDURE — 87077 CULTURE AEROBIC IDENTIFY: CPT | Performed by: SURGERY

## 2022-10-02 PROCEDURE — 99226 PR SUBSEQUENT OBSERVATION CARE,LEVEL III: CPT | Mod: ,,, | Performed by: PHYSICIAN ASSISTANT

## 2022-10-02 PROCEDURE — 36415 COLL VENOUS BLD VENIPUNCTURE: CPT | Performed by: SURGERY

## 2022-10-02 PROCEDURE — 80202 ASSAY OF VANCOMYCIN: CPT | Performed by: SURGERY

## 2022-10-02 PROCEDURE — 96372 THER/PROPH/DIAG INJ SC/IM: CPT | Mod: 59 | Performed by: SURGERY

## 2022-10-02 PROCEDURE — 87102 FUNGUS ISOLATION CULTURE: CPT | Performed by: SURGERY

## 2022-10-02 PROCEDURE — 87206 SMEAR FLUORESCENT/ACID STAI: CPT | Performed by: SURGERY

## 2022-10-02 PROCEDURE — G0378 HOSPITAL OBSERVATION PER HR: HCPCS

## 2022-10-02 PROCEDURE — 71000033 HC RECOVERY, INTIAL HOUR: Performed by: SURGERY

## 2022-10-02 RX ORDER — FENTANYL CITRATE 50 UG/ML
INJECTION, SOLUTION INTRAMUSCULAR; INTRAVENOUS
Status: DISCONTINUED | OUTPATIENT
Start: 2022-10-02 | End: 2022-10-02

## 2022-10-02 RX ORDER — PROPOFOL 10 MG/ML
INJECTION, EMULSION INTRAVENOUS CONTINUOUS PRN
Status: DISCONTINUED | OUTPATIENT
Start: 2022-10-02 | End: 2022-10-02

## 2022-10-02 RX ORDER — SODIUM CHLORIDE 0.9 % (FLUSH) 0.9 %
3 SYRINGE (ML) INJECTION
Status: DISCONTINUED | OUTPATIENT
Start: 2022-10-02 | End: 2022-10-02

## 2022-10-02 RX ORDER — PHENYLEPHRINE HYDROCHLORIDE 10 MG/ML
INJECTION INTRAVENOUS
Status: DISCONTINUED | OUTPATIENT
Start: 2022-10-02 | End: 2022-10-02

## 2022-10-02 RX ORDER — LIDOCAINE HCL/PF 100 MG/5ML
SYRINGE (ML) INTRAVENOUS
Status: DISCONTINUED | OUTPATIENT
Start: 2022-10-02 | End: 2022-10-02

## 2022-10-02 RX ORDER — ONDANSETRON 2 MG/ML
INJECTION INTRAMUSCULAR; INTRAVENOUS
Status: DISCONTINUED | OUTPATIENT
Start: 2022-10-02 | End: 2022-10-02

## 2022-10-02 RX ORDER — PROPOFOL 10 MG/ML
INJECTION, EMULSION INTRAVENOUS
Status: DISCONTINUED | OUTPATIENT
Start: 2022-10-02 | End: 2022-10-02

## 2022-10-02 RX ORDER — HYDROCODONE BITARTRATE AND ACETAMINOPHEN 5; 325 MG/1; MG/1
1 TABLET ORAL EVERY 4 HOURS PRN
Status: CANCELLED | OUTPATIENT
Start: 2022-10-02

## 2022-10-02 RX ORDER — SODIUM CHLORIDE, SODIUM LACTATE, POTASSIUM CHLORIDE, CALCIUM CHLORIDE 600; 310; 30; 20 MG/100ML; MG/100ML; MG/100ML; MG/100ML
INJECTION, SOLUTION INTRAVENOUS CONTINUOUS PRN
Status: DISCONTINUED | OUTPATIENT
Start: 2022-10-02 | End: 2022-10-02

## 2022-10-02 RX ORDER — LIDOCAINE HYDROCHLORIDE 20 MG/ML
INJECTION, SOLUTION EPIDURAL; INFILTRATION; INTRACAUDAL; PERINEURAL
Status: DISCONTINUED | OUTPATIENT
Start: 2022-10-02 | End: 2022-10-02 | Stop reason: HOSPADM

## 2022-10-02 RX ORDER — PROCHLORPERAZINE EDISYLATE 5 MG/ML
5 INJECTION INTRAMUSCULAR; INTRAVENOUS EVERY 30 MIN PRN
Status: DISCONTINUED | OUTPATIENT
Start: 2022-10-02 | End: 2022-10-02

## 2022-10-02 RX ORDER — DIPHENHYDRAMINE HYDROCHLORIDE 50 MG/ML
12.5 INJECTION INTRAMUSCULAR; INTRAVENOUS EVERY 30 MIN PRN
Status: DISCONTINUED | OUTPATIENT
Start: 2022-10-02 | End: 2022-10-02

## 2022-10-02 RX ORDER — MEPERIDINE HYDROCHLORIDE 25 MG/ML
12.5 INJECTION INTRAMUSCULAR; INTRAVENOUS; SUBCUTANEOUS ONCE AS NEEDED
Status: DISCONTINUED | OUTPATIENT
Start: 2022-10-02 | End: 2022-10-02

## 2022-10-02 RX ORDER — EPHEDRINE SULFATE 50 MG/ML
INJECTION, SOLUTION INTRAVENOUS
Status: DISCONTINUED | OUTPATIENT
Start: 2022-10-02 | End: 2022-10-02

## 2022-10-02 RX ORDER — OXYCODONE HYDROCHLORIDE 5 MG/1
5 TABLET ORAL
Status: DISCONTINUED | OUTPATIENT
Start: 2022-10-02 | End: 2022-10-02

## 2022-10-02 RX ORDER — SODIUM CHLORIDE 9 MG/ML
INJECTION, SOLUTION INTRAVENOUS CONTINUOUS
Status: CANCELLED | OUTPATIENT
Start: 2022-10-02

## 2022-10-02 RX ORDER — HYDROMORPHONE HYDROCHLORIDE 2 MG/ML
0.4 INJECTION, SOLUTION INTRAMUSCULAR; INTRAVENOUS; SUBCUTANEOUS EVERY 5 MIN PRN
Status: DISCONTINUED | OUTPATIENT
Start: 2022-10-02 | End: 2022-10-02

## 2022-10-02 RX ORDER — VANCOMYCIN HCL IN 5 % DEXTROSE 1G/250ML
1000 PLASTIC BAG, INJECTION (ML) INTRAVENOUS
Status: DISCONTINUED | OUTPATIENT
Start: 2022-10-02 | End: 2022-10-04 | Stop reason: HOSPADM

## 2022-10-02 RX ADMIN — SENNOSIDES AND DOCUSATE SODIUM 1 TABLET: 50; 8.6 TABLET ORAL at 08:10

## 2022-10-02 RX ADMIN — FENTANYL CITRATE 100 MCG: 50 INJECTION, SOLUTION INTRAMUSCULAR; INTRAVENOUS at 07:10

## 2022-10-02 RX ADMIN — PHENYLEPHRINE HYDROCHLORIDE 100 MCG: 10 INJECTION INTRAVENOUS at 07:10

## 2022-10-02 RX ADMIN — ONDANSETRON HYDROCHLORIDE 4 MG: 2 INJECTION INTRAMUSCULAR; INTRAVENOUS at 07:10

## 2022-10-02 RX ADMIN — SENNOSIDES AND DOCUSATE SODIUM 1 TABLET: 50; 8.6 TABLET ORAL at 09:10

## 2022-10-02 RX ADMIN — FENTANYL CITRATE 50 MCG: 50 INJECTION, SOLUTION INTRAMUSCULAR; INTRAVENOUS at 07:10

## 2022-10-02 RX ADMIN — LIDOCAINE HYDROCHLORIDE 80 MG: 20 INJECTION, SOLUTION INTRAVENOUS at 07:10

## 2022-10-02 RX ADMIN — QUETIAPINE FUMARATE 400 MG: 200 TABLET, EXTENDED RELEASE ORAL at 08:10

## 2022-10-02 RX ADMIN — ENOXAPARIN SODIUM 40 MG: 100 INJECTION SUBCUTANEOUS at 04:10

## 2022-10-02 RX ADMIN — HYDROMORPHONE HYDROCHLORIDE 0.4 MG: 2 INJECTION INTRAMUSCULAR; INTRAVENOUS; SUBCUTANEOUS at 08:10

## 2022-10-02 RX ADMIN — PROPOFOL 100 MG: 10 INJECTION, EMULSION INTRAVENOUS at 07:10

## 2022-10-02 RX ADMIN — POLYETHYLENE GLYCOL 3350 17 G: 17 POWDER, FOR SOLUTION ORAL at 09:10

## 2022-10-02 RX ADMIN — SODIUM CHLORIDE, SODIUM LACTATE, POTASSIUM CHLORIDE, AND CALCIUM CHLORIDE: 600; 310; 30; 20 INJECTION, SOLUTION INTRAVENOUS at 07:10

## 2022-10-02 RX ADMIN — KETOROLAC TROMETHAMINE 15 MG: 30 INJECTION, SOLUTION INTRAMUSCULAR; INTRAVENOUS at 09:10

## 2022-10-02 RX ADMIN — PROPOFOL 150 MCG/KG/MIN: 10 INJECTION, EMULSION INTRAVENOUS at 07:10

## 2022-10-02 RX ADMIN — CEFTRIAXONE 2 G: 2 INJECTION, SOLUTION INTRAVENOUS at 03:10

## 2022-10-02 RX ADMIN — PHENYLEPHRINE HYDROCHLORIDE 200 MCG: 10 INJECTION INTRAVENOUS at 07:10

## 2022-10-02 RX ADMIN — MORPHINE SULFATE 2 MG: 2 INJECTION, SOLUTION INTRAMUSCULAR; INTRAVENOUS at 04:10

## 2022-10-02 RX ADMIN — VANCOMYCIN HYDROCHLORIDE 1000 MG: 1 INJECTION, POWDER, LYOPHILIZED, FOR SOLUTION INTRAVENOUS at 04:10

## 2022-10-02 RX ADMIN — KETOROLAC TROMETHAMINE 15 MG: 30 INJECTION, SOLUTION INTRAMUSCULAR; INTRAVENOUS at 03:10

## 2022-10-02 RX ADMIN — EPHEDRINE SULFATE 20 MG: 50 INJECTION INTRAVENOUS at 07:10

## 2022-10-02 NOTE — TRANSFER OF CARE
"Anesthesia Transfer of Care Note    Patient: Roselyn Vieira    Procedure(s) Performed: Procedure(s) (LRB):  INCISION AND DRAINAGE, ABSCESS, left hip (Left)    Patient location: PACU    Anesthesia Type: general    Transport from OR: Transported from OR on room air with adequate spontaneous ventilation    Post pain: adequate analgesia    Post assessment: no apparent anesthetic complications and tolerated procedure well    Post vital signs: stable    Level of consciousness: sedated    Nausea/Vomiting: no nausea/vomiting    Complications: none    Transfer of care protocol was followed      Last vitals:   Visit Vitals  BP (!) 115/56 (BP Location: Right arm, Patient Position: Lying)   Pulse 107   Temp 37.3 °C (99.2 °F) (Oral)   Resp 16   Ht 5' 9" (1.753 m)   Wt 58.9 kg (129 lb 13.6 oz)   LMP 09/05/2022   SpO2 98%   Breastfeeding No   BMI 19.18 kg/m²     "

## 2022-10-02 NOTE — PLAN OF CARE
POC reviewed with patient. AAOx4. VS stable on room air. Complaints of pain and nausea treated with PRNs  according to MAR. No other complaints verbalized during shift.  No injuries, falls, or trauma occurred during shift. Purposeful rounding completed. Bed low and locked with side rails up x 3 and call light within reach. Pt NPO.

## 2022-10-02 NOTE — SUBJECTIVE & OBJECTIVE
Interval History: Taken to OR today with surgery for I&D. Follow cultures. Seen at [facility], discussed pan. Pain well controlled    Review of Systems   Constitutional:  Positive for chills and fever.   HENT:  Negative for congestion and sore throat.    Eyes:  Negative for pain and redness.   Respiratory:  Negative for chest tightness and shortness of breath.    Cardiovascular:  Negative for chest pain and leg swelling.   Gastrointestinal:  Negative for abdominal distention, abdominal pain, diarrhea, nausea and vomiting.   Genitourinary:  Negative for difficulty urinating.   Musculoskeletal:  Positive for gait problem (walks with a limp due to left hip pain). Negative for joint swelling.   Skin:  Negative for rash and wound.   Neurological:  Negative for weakness and headaches.   Psychiatric/Behavioral:  Negative for agitation and behavioral problems.    Objective:     Vital Signs (Most Recent):  Temp: 99.7 °F (37.6 °C) (10/02/22 0842)  Pulse: 96 (10/02/22 0830)  Resp: 16 (10/02/22 0842)  BP: (!) 110/55 (10/02/22 0830)  SpO2: 100 % (10/02/22 0830)   Vital Signs (24h Range):  Temp:  [98.5 °F (36.9 °C)-100 °F (37.8 °C)] 99.7 °F (37.6 °C)  Pulse:  [] 96  Resp:  [16-18] 16  SpO2:  [95 %-100 %] 100 %  BP: (110-126)/(55-63) 110/55     Weight: 58.9 kg (129 lb 13.6 oz)  Body mass index is 19.18 kg/m².    Intake/Output Summary (Last 24 hours) at 10/2/2022 1014  Last data filed at 10/2/2022 0842  Gross per 24 hour   Intake 1080 ml   Output 800 ml   Net 280 ml      Physical Exam  Vitals and nursing note reviewed.   Constitutional:       Appearance: She is not ill-appearing.   HENT:      Head: Normocephalic and atraumatic.      Nose: No congestion or rhinorrhea.   Cardiovascular:      Rate and Rhythm: Normal rate and regular rhythm.   Pulmonary:      Effort: Pulmonary effort is normal.      Breath sounds: Normal breath sounds.   Abdominal:      General: There is no distension.      Palpations: Abdomen is soft.       Comments:      Musculoskeletal:         General: Swelling (left hip) and tenderness (left area near the hip joint) present.      Cervical back: Normal range of motion and neck supple.      Right lower leg: No edema.      Left lower leg: No edema.   Skin:     General: Skin is warm and dry.      Findings: Bruising and erythema (left back, area of cellulitis; dressing CDI) present.   Neurological:      General: No focal deficit present.      Mental Status: She is alert.   Psychiatric:         Mood and Affect: Mood normal.         Behavior: Behavior normal.       Significant Labs: All pertinent labs within the past 24 hours have been reviewed.    Significant Imaging: I have reviewed all pertinent imaging results/findings within the past 24 hours.

## 2022-10-02 NOTE — OP NOTE
[facility] Surg [facility])  General Surgery  Operative Note    SUMMARY     Date of Procedure: 10/2/2022     Procedure: Procedure(s) (LRB):  INCISION AND DRAINAGE, ABSCESS, left hip (Left)       Surgeon(s) and Role:     * Demar Raymundo MD - Primary    Assisting Surgeon: None    Pre-Operative Diagnosis: Cellulitis of hip, left [L03.116]    Post-Operative Diagnosis: Post-Op Diagnosis Codes:     * Cellulitis of hip, left [L03.116]    Anesthesia: General    Operative Findings (including complications, if any):  5 x 4 cm abscess purulence, deep    Description of Technical Procedures:  After consent was obtained patient was brought to the operating room placed in the lateral position with the left side up.    Prepped and draped.    Time-out performed.    Local anesthetic was injected with 2% lidocaine   Incision was made with a 15 blade scalpel and a counter incision was made with another 15 blade scalpel.    Hemostat was used to break up the large pocket of pus.    Penrose drain was then placed.    Copious irrigation was used until clear.    Packing was placed at the end of the case.            Estimated Blood Loss (EBL): * No values recorded between 10/2/2022  7:40 AM and 10/2/2022  8:05 AM *           Implants: * No implants in log *    Specimens:   Specimen (24h ago, onward)      None                    Condition: Good    Disposition: PACU - hemodynamically stable.    Attestation: I was present and scrubbed for the entire procedure.

## 2022-10-02 NOTE — OR NURSING
Pt states pain tolerable. No change from previous assessment. Prepared for transfer to inpt room. Report called to [place] RN 3CC. Pt placed on transport.

## 2022-10-02 NOTE — PROGRESS NOTES
Pharmacokinetic Assessment Follow Up: IV Vancomycin    Vancomycin serum concentration assessment(s):    The trough level was drawn correctly and can be used to guide therapy at this time. The measurement is below the desired definitive target range of 10 to 20 mcg/mL.    Vancomycin Regimen Plan:    Change regimen to Vancomycin 1000 mg IV every 8 hours with next serum trough concentration measured at 1530 prior to 4th dose on 10/3    Drug levels (last 3 results):  Recent Labs   Lab Result Units 10/02/22  1403   Vancomycin-Trough ug/mL 6.7*       Pharmacy will continue to follow and monitor vancomycin.    Please contact pharmacy at extension [phone] for questions regarding this assessment.    Thank you for the consult,   Paulina Elliott       Patient brief summary:  Roselyn Vieira is a 32 y.o. female initiated on antimicrobial therapy with IV Vancomycin for treatment of skin & soft tissue infection    The patient's current regimen is 1000 mg q8h    Drug Allergies:   Review of patient's allergies indicates:  No Known Allergies    Actual Body Weight:   58.9 kg    Renal Function:   Estimated Creatinine Clearance: 107.3 mL/min (based on SCr of 0.7 mg/dL).,     Dialysis Method (if applicable):  N/A    CBC (last 72 hours):  Recent Labs   Lab Result Units 09/29/22  1625 09/30/22  0554 10/01/22  0451 10/02/22  0431   WBC K/uL 15.20* 13.51* 14.32* 16.94*   Hemoglobin g/dL 12.2 10.6* 10.9* 11.1*   Hematocrit % 37.0 32.2* 32.4* 32.6*   Platelets K/uL 374 329 371 409   Gran % % 85.6* 80.8* 81.0* 80.1*   Lymph % % 7.0* 9.5* 8.9* 9.4*   Mono % % 6.3 8.0 8.4 8.7   Eosinophil % % 0.3 0.8 0.4 0.5   Basophil % % 0.3 0.2 0.1 0.2   Differential Method  Automated Automated Automated Automated       Metabolic Panel (last 72 hours):  Recent Labs   Lab Result Units 09/29/22  1625 09/30/22  0554 10/01/22  0451 10/02/22  0431   Sodium mmol/L 137 138 135* 135*   Potassium mmol/L 3.9 3.7 3.5 3.6   Chloride mmol/L 106 110 108 107   CO2 mmol/L 24  22* 21* 22*   Glucose mg/dL 79 116* 136* 111*   BUN mg/dL 10 5* 3* 5*   Creatinine mg/dL 0.8 0.6 0.7 0.7   Albumin g/dL 3.6 2.8* 2.7* 2.7*   Total Bilirubin mg/dL 0.4 0.3 0.2 0.3   Alkaline Phosphatase U/L 77 63 67 77   AST U/L 11 9* 11 14   ALT U/L 7* 6* 6* 7*       Vancomycin Administrations:  vancomycin given in the last 96 hours                     vancomycin in dextrose 5 % 1 gram/250 mL IVPB 1,000 mg (mg) 1,000 mg New Bag 10/02/22 0405     1,000 mg New Bag 10/01/22 1531     1,000 mg New Bag  0246    vancomycin 1.5 g in dextrose 5 % 250 mL IVPB (ready to mix) (mg) 1,500 mg New Bag 09/30/22 1533                    Microbiologic Results:  Microbiology Results (last 7 days)       Procedure Component Value Units Date/Time    AFB Culture & Smear [[id number]] Collected: 10/02/22 0844    Order Status: Sent Specimen: Incision site from Hip, Left Updated: 10/02/22 0924    Culture, Anaerobic [[id number]] Collected: 10/02/22 0844    Order Status: Sent Specimen: Incision site from Hip, Left Updated: 10/02/22 0923    Aerobic culture [[id number]] Collected: 10/02/22 0844    Order Status: Sent Specimen: Incision site from Hip, Left Updated: 10/02/22 0923    Fungus culture [[id number]] Collected: 10/02/22 0844    Order Status: Sent Specimen: Incision site from Hip, Left Updated: 10/02/22 0922    Blood culture x two cultures. Draw prior to antibiotics. [[id number]] Collected: 09/29/22 1628    Order Status: Completed Specimen: Blood from Peripheral, Antecubital, Right Updated: 10/02/22 0917     Blood Culture, Routine Gram stain yobani bottle: Gram positive rods      Results called to and read back by: Yasmine Levy RN 10/01/2022  10:06    Narrative:      Aerobic and anaerobic    Blood culture [[id number]] Collected: 10/01/22 1202    Order Status: Completed Specimen: Blood from Antecubital, Left Arm Updated: 10/02/22 0812     Blood Culture, Routine No growth to date    Blood culture [[id number]] Collected: 10/01/22 1202    Order Status:  Completed Specimen: Blood from Antecubital, Right Arm Updated: 10/02/22 0812     Blood Culture, Routine No growth to date    Blood culture x two cultures. Draw prior to antibiotics. [[id number]] Collected: 09/29/22 1631    Order Status: Completed Specimen: Blood from Wrist, Left Updated: 10/01/22 2212     Blood Culture, Routine No Growth to date      No Growth to date      No Growth to date    Narrative:      Aerobic and anaerobic

## 2022-10-02 NOTE — ASSESSMENT & PLAN NOTE
- Fever, chills and worsening pain and erythema for 3 days, worsening, with Tmax 102 at home and associated limitation to ambulation due to pain. Denies insect bite or trauma to left hip, denies recent dental work, hx of diabetes nor is she on long term steroids.   - HDS with Tmax 100.8 and tachycardia 103 . Leukocytosis 15.2 with elevated .3 and lactate 2.0. Started on IV Clindamycin 600mg q8. US done at ER and shown no fluid collection. Despite downtrending WBC to 13.51 today, patient reports persistent pain to left hip with increased erythema (see images).   - Extremely tender to touch, 10/10 with movement, no drainage, ROM to left hip limited by pain.  - Hypocalcemia 7.9, corrected for albumin - 8.5     Plan:  - MRI left hip without bone involvement, however appears they may be fluid collection forming  - surgery eval to assess fluid formation: I&D in OR 10/2, follow cultures  - Blood cultures from admit growing GPR, repeat pending  - Broaden abx cover to IV Ceftriaxone 2g qd and IV Vancomycin as per pharmacy- improving clinically

## 2022-10-02 NOTE — ANESTHESIA POSTPROCEDURE EVALUATION
Anesthesia Post Evaluation    Patient: Roselyn Vieira    Procedure(s) Performed: Procedure(s) (LRB):  INCISION AND DRAINAGE, ABSCESS, left hip (Left)    Final Anesthesia Type: general      Patient location during evaluation: PACU  Patient participation: Yes- Able to Participate  Level of consciousness: awake and alert  Post-procedure vital signs: reviewed and stable  Pain management: adequate  Airway patency: patent    PONV status at discharge: No PONV  Anesthetic complications: no      Cardiovascular status: blood pressure returned to baseline  Respiratory status: spontaneous ventilation  Hydration status: euvolemic  Follow-up not needed.          Vitals Value Taken Time   /57 10/02/22 0812   Temp 37.3 °C (99.2 °F) 10/02/22 0403   Pulse 100 10/02/22 0817   Resp 16 10/02/22 0814   SpO2 98 % 10/02/22 0817   Vitals shown include unvalidated device data.      No case tracking events are documented in the log.      Pain/Kim Score: Pain Rating Prior to Med Admin: 7 (10/2/2022  8:14 AM)  Pain Rating Post Med Admin: 5 (10/2/2022  4:39 AM)

## 2022-10-02 NOTE — INTERVAL H&P NOTE
The patient has been examined and the H&P has been reviewed:    I concur with the findings and no changes have occurred since H&P was written.    Surgery risks, benefits and alternative options discussed and understood by patient/family.          Active Hospital Problems    Diagnosis  POA    *Cellulitis of back except buttock [L03.312]  Yes    Nicotine dependence due to vaping tobacco product [F17.290]  Yes    Bipolar disorder [F31.9]  Yes     Chronic    ADHD [F90.9]  Yes     Chronic      Resolved Hospital Problems   No resolved problems to display.

## 2022-10-02 NOTE — ANESTHESIA PREPROCEDURE EVALUATION
10/02/2022  Roselyn Vieira is a 32 y.o., female.      Pre-op Assessment    I have reviewed the Patient Summary Reports.     I have reviewed the Nursing Notes. I have reviewed the NPO Status.   I have reviewed the Medications.     Review of Systems  Anesthesia Hx:  Denies Family Hx of Anesthesia complications.   Denies Personal Hx of Anesthesia complications.   Social:  Non-Smoker, Smoker    Hematology/Oncology:     Oncology Normal    -- Anemia: Hematology Comments: hgb 11    Cardiovascular:  Cardiovascular Normal Exercise tolerance: good     Pulmonary:  Pulmonary Normal    Renal/:  Renal/ Normal     Hepatic/GI:  Hepatic/GI Normal    Musculoskeletal:  Musculoskeletal Normal    Neurological:  Neurology Normal    Endocrine:  Endocrine Normal    Dermatological:   Lift Hip Cellulitis/Abscess   Psych:   anxiety ADHD  Bipolar         Physical Exam  General: Well nourished and Cooperative    Airway:  Mallampati: I   Mouth Opening: Normal  TM Distance: Normal  Neck ROM: Normal ROM    Dental:  Intact        Anesthesia Plan  Type of Anesthesia, risks & benefits discussed:    Anesthesia Type: Gen Supraglottic Airway, Gen ETT  Intra-op Monitoring Plan: Standard ASA Monitors  Post Op Pain Control Plan: multimodal analgesia  Induction:  IV  Airway Plan: Video  Informed Consent: Informed consent signed with the Patient and all parties understand the risks and agree with anesthesia plan.  All questions answered.   ASA Score: 2  Day of Surgery Review of History & Physical: H&P Update referred to the surgeon/provider.    Ready For Surgery From Anesthesia Perspective.     .

## 2022-10-02 NOTE — PROGRESS NOTES
[facility] Surg ([facility] Medicine  Progress Note    Patient Name: Roselyn Vieira  MRN: [id number]  Patient Class: OP- Observation   Admission Date: 9/29/2022  Length of Stay: 0 days  Attending Physician: Mauricio Montero MD  Primary Care Provider: Primary Doctor No        Subjective:     Principal Problem:Cellulitis of back except buttock        HPI:  Roselyn Vieira is a 32 year old female with previous smoking history, ADHD presenting with 3 day onset of left hip cellulitis with associated fever, chills and worsening pain and erythema. Patient denies remembering any insect bite or trauma to left hip, but would be a possibility of getting it as she is outdoors most of the time with her dog and working as a . Left hip soreness started 3 days PTA, then fever Tmax 102 the next day and increasing area of erythema 1 day PTA with limitations to ambulation due to severe pain. Patient reports walking with a limp due to pain. Patient denies headache, nausea, vomiting, diarrhea, chest pain, SOB. Patient also denies recent dental work, hx of diabetes nor is she on long term steroids.    HDS with Tmax 100.8. Leukocytosis 15.2 with elevated .3 and lactate 2.0. Started on IV Clindamycin 600mg q8. US done at ER and shown no fluid collection. Despite downtrending WBC to 13.51 today, patient reports persistent pain to left hip with increased erythema (see images).     Patient is upgraded to Observation status and will be managed by Hospital Medicine.       Overview/Hospital Course:  Roselyn Vieira was admitted for cellulitis of her L hip. Febrile and with leukocytosis on admission. Started on Clindamycin but did not improve, switched to IV Vanc and CTX. MRI pelvis with possible fluid formation, surgery consulted, taken to OR 10/2 for I&D, follow cultures. Bcx with GPR, repeat ordered.         Interval History: Taken to OR today with surgery for I&D. Follow cultures. Seen at [facility], discussed pan.  Pain well controlled    Review of Systems   Constitutional:  Positive for chills and fever.   HENT:  Negative for congestion and sore throat.    Eyes:  Negative for pain and redness.   Respiratory:  Negative for chest tightness and shortness of breath.    Cardiovascular:  Negative for chest pain and leg swelling.   Gastrointestinal:  Negative for abdominal distention, abdominal pain, diarrhea, nausea and vomiting.   Genitourinary:  Negative for difficulty urinating.   Musculoskeletal:  Positive for gait problem (walks with a limp due to left hip pain). Negative for joint swelling.   Skin:  Negative for rash and wound.   Neurological:  Negative for weakness and headaches.   Psychiatric/Behavioral:  Negative for agitation and behavioral problems.    Objective:     Vital Signs (Most Recent):  Temp: 99.7 °F (37.6 °C) (10/02/22 0842)  Pulse: 96 (10/02/22 0830)  Resp: 16 (10/02/22 0842)  BP: (!) 110/55 (10/02/22 0830)  SpO2: 100 % (10/02/22 0830)   Vital Signs (24h Range):  Temp:  [98.5 °F (36.9 °C)-100 °F (37.8 °C)] 99.7 °F (37.6 °C)  Pulse:  [] 96  Resp:  [16-18] 16  SpO2:  [95 %-100 %] 100 %  BP: (110-126)/(55-63) 110/55     Weight: 58.9 kg (129 lb 13.6 oz)  Body mass index is 19.18 kg/m².    Intake/Output Summary (Last 24 hours) at 10/2/2022 1014  Last data filed at 10/2/2022 0842  Gross per 24 hour   Intake 1080 ml   Output 800 ml   Net 280 ml      Physical Exam  Vitals and nursing note reviewed.   Constitutional:       Appearance: She is not ill-appearing.   HENT:      Head: Normocephalic and atraumatic.      Nose: No congestion or rhinorrhea.   Cardiovascular:      Rate and Rhythm: Normal rate and regular rhythm.   Pulmonary:      Effort: Pulmonary effort is normal.      Breath sounds: Normal breath sounds.   Abdominal:      General: There is no distension.      Palpations: Abdomen is soft.      Comments:      Musculoskeletal:         General: Swelling (left hip) and tenderness (left area near the hip joint)  present.      Cervical back: Normal range of motion and neck supple.      Right lower leg: No edema.      Left lower leg: No edema.   Skin:     General: Skin is warm and dry.      Findings: Bruising and erythema (left back, area of cellulitis; dressing CDI) present.   Neurological:      General: No focal deficit present.      Mental Status: She is alert.   Psychiatric:         Mood and Affect: Mood normal.         Behavior: Behavior normal.       Significant Labs: All pertinent labs within the past 24 hours have been reviewed.    Significant Imaging: I have reviewed all pertinent imaging results/findings within the past 24 hours.      Assessment/Plan:      * Cellulitis of back except buttock  - Fever, chills and worsening pain and erythema for 3 days, worsening, with Tmax 102 at home and associated limitation to ambulation due to pain. Denies insect bite or trauma to left hip, denies recent dental work, hx of diabetes nor is she on long term steroids.   - HDS with Tmax 100.8 and tachycardia 103 . Leukocytosis 15.2 with elevated .3 and lactate 2.0. Started on IV Clindamycin 600mg q8. US done at ER and shown no fluid collection. Despite downtrending WBC to 13.51 today, patient reports persistent pain to left hip with increased erythema (see images).   - Extremely tender to touch, 10/10 with movement, no drainage, ROM to left hip limited by pain.  - Hypocalcemia 7.9, corrected for albumin - 8.5     Plan:  - MRI left hip without bone involvement, however appears they may be fluid collection forming  - surgery eval to assess fluid formation: I&D in OR 10/2, follow cultures  - Blood cultures from admit growing GPR, repeat pending  - Broaden abx cover to IV Ceftriaxone 2g qd and IV Vancomycin as per pharmacy- improving clinically      ADHD  Continue home dose lisdexamfetamine 20mg qd      Bipolar disorder  Resume home dose quetiapine 400mg qd    Nicotine dependence due to vaping tobacco product  Previous 1 pack a  day smoker of ?years, currently vapes   Counseled to quit, amenable; spoke with patient for 3 min    - Continue nicotine replacement therapy 21mg/24hr        VTE Risk Mitigation (From admission, onward)         Ordered     enoxaparin injection 40 mg  Daily         09/30/22 1541     IP VTE HIGH RISK PATIENT  Once         09/30/22 1541     Place sequential compression device  Until discontinued         09/30/22 1541     Place ANDREW hose  Until discontinued         09/29/22 1756                Discharge Planning   BOBBY:      Code Status: Full Code   Is the patient medically ready for discharge?:     Reason for patient still in hospital (select all that apply): Patient trending condition, Laboratory test and Treatment  Discharge Plan A: Home                  Halley Castle PA-C  Department of Hospital Medicine   [facility] ([place])

## 2022-10-03 VITALS
HEART RATE: 97 BPM | TEMPERATURE: 98 F | RESPIRATION RATE: 16 BRPM | DIASTOLIC BLOOD PRESSURE: 70 MMHG | OXYGEN SATURATION: 97 % | SYSTOLIC BLOOD PRESSURE: 117 MMHG | BODY MASS INDEX: 19.24 KG/M2 | WEIGHT: 129.88 LBS | HEIGHT: 69 IN

## 2022-10-03 LAB
ALBUMIN SERPL BCP-MCNC: 2.6 G/DL (ref 3.5–5.2)
ALP SERPL-CCNC: 67 U/L (ref 55–135)
ALT SERPL W/O P-5'-P-CCNC: 9 U/L (ref 10–44)
ANION GAP SERPL CALC-SCNC: 5 MMOL/L (ref 8–16)
AST SERPL-CCNC: 13 U/L (ref 10–40)
BACTERIA BLD CULT: ABNORMAL
BASOPHILS # BLD AUTO: 0.03 K/UL (ref 0–0.2)
BASOPHILS NFR BLD: 0.5 % (ref 0–1.9)
BILIRUB SERPL-MCNC: 0.2 MG/DL (ref 0.1–1)
BUN SERPL-MCNC: 6 MG/DL (ref 6–20)
CALCIUM SERPL-MCNC: 8.9 MG/DL (ref 8.7–10.5)
CHLORIDE SERPL-SCNC: 109 MMOL/L (ref 95–110)
CO2 SERPL-SCNC: 24 MMOL/L (ref 23–29)
CREAT SERPL-MCNC: 0.7 MG/DL (ref 0.5–1.4)
DIFFERENTIAL METHOD: ABNORMAL
EOSINOPHIL # BLD AUTO: 0.2 K/UL (ref 0–0.5)
EOSINOPHIL NFR BLD: 3 % (ref 0–8)
ERYTHROCYTE [DISTWIDTH] IN BLOOD BY AUTOMATED COUNT: 13.7 % (ref 11.5–14.5)
EST. GFR  (NO RACE VARIABLE): >60 ML/MIN/1.73 M^2
GLUCOSE SERPL-MCNC: 93 MG/DL (ref 70–110)
HCT VFR BLD AUTO: 31.5 % (ref 37–48.5)
HGB BLD-MCNC: 10.6 G/DL (ref 12–16)
IMM GRANULOCYTES # BLD AUTO: 0.05 K/UL (ref 0–0.04)
IMM GRANULOCYTES NFR BLD AUTO: 0.8 % (ref 0–0.5)
LYMPHOCYTES # BLD AUTO: 1.7 K/UL (ref 1–4.8)
LYMPHOCYTES NFR BLD: 26.2 % (ref 18–48)
MCH RBC QN AUTO: 30.1 PG (ref 27–31)
MCHC RBC AUTO-ENTMCNC: 33.7 G/DL (ref 32–36)
MCV RBC AUTO: 90 FL (ref 82–98)
MONOCYTES # BLD AUTO: 0.8 K/UL (ref 0.3–1)
MONOCYTES NFR BLD: 12.3 % (ref 4–15)
NEUTROPHILS # BLD AUTO: 3.8 K/UL (ref 1.8–7.7)
NEUTROPHILS NFR BLD: 57.2 % (ref 38–73)
NRBC BLD-RTO: 0 /100 WBC
PLATELET # BLD AUTO: 438 K/UL (ref 150–450)
PMV BLD AUTO: 9.7 FL (ref 9.2–12.9)
POTASSIUM SERPL-SCNC: 3.9 MMOL/L (ref 3.5–5.1)
PROT SERPL-MCNC: 6.1 G/DL (ref 6–8.4)
RBC # BLD AUTO: 3.52 M/UL (ref 4–5.4)
SODIUM SERPL-SCNC: 138 MMOL/L (ref 136–145)
VANCOMYCIN TROUGH SERPL-MCNC: 29.6 UG/ML (ref 10–22)
WBC # BLD AUTO: 6.57 K/UL (ref 3.9–12.7)

## 2022-10-03 PROCEDURE — G0378 HOSPITAL OBSERVATION PER HR: HCPCS

## 2022-10-03 PROCEDURE — 85025 COMPLETE CBC W/AUTO DIFF WBC: CPT | Performed by: SURGERY

## 2022-10-03 PROCEDURE — 80053 COMPREHEN METABOLIC PANEL: CPT | Performed by: SURGERY

## 2022-10-03 PROCEDURE — 63600175 PHARM REV CODE 636 W HCPCS: Performed by: SURGERY

## 2022-10-03 PROCEDURE — 94799 UNLISTED PULMONARY SVC/PX: CPT

## 2022-10-03 PROCEDURE — 25000003 PHARM REV CODE 250: Performed by: SURGERY

## 2022-10-03 PROCEDURE — 80202 ASSAY OF VANCOMYCIN: CPT | Performed by: STUDENT IN AN ORGANIZED HEALTH CARE EDUCATION/TRAINING PROGRAM

## 2022-10-03 PROCEDURE — 63600175 PHARM REV CODE 636 W HCPCS: Performed by: STUDENT IN AN ORGANIZED HEALTH CARE EDUCATION/TRAINING PROGRAM

## 2022-10-03 PROCEDURE — 25000003 PHARM REV CODE 250: Performed by: STUDENT IN AN ORGANIZED HEALTH CARE EDUCATION/TRAINING PROGRAM

## 2022-10-03 PROCEDURE — 99233 SBSQ HOSP IP/OBS HIGH 50: CPT | Mod: ,,, | Performed by: PHYSICIAN ASSISTANT

## 2022-10-03 PROCEDURE — 99233 PR SUBSEQUENT HOSPITAL CARE,LEVL III: ICD-10-PCS | Mod: ,,, | Performed by: PHYSICIAN ASSISTANT

## 2022-10-03 PROCEDURE — 99231 PR SUBSEQUENT HOSPITAL CARE,LEVL I: ICD-10-PCS | Mod: ,,, | Performed by: SURGERY

## 2022-10-03 PROCEDURE — 25000003 PHARM REV CODE 250: Performed by: PHYSICIAN ASSISTANT

## 2022-10-03 PROCEDURE — 36415 COLL VENOUS BLD VENIPUNCTURE: CPT | Performed by: SURGERY

## 2022-10-03 PROCEDURE — 94761 N-INVAS EAR/PLS OXIMETRY MLT: CPT

## 2022-10-03 PROCEDURE — 99231 SBSQ HOSP IP/OBS SF/LOW 25: CPT | Mod: ,,, | Performed by: SURGERY

## 2022-10-03 PROCEDURE — 11000001 HC ACUTE MED/SURG PRIVATE ROOM

## 2022-10-03 PROCEDURE — 36415 COLL VENOUS BLD VENIPUNCTURE: CPT | Performed by: STUDENT IN AN ORGANIZED HEALTH CARE EDUCATION/TRAINING PROGRAM

## 2022-10-03 RX ORDER — HYDROCODONE BITARTRATE AND ACETAMINOPHEN 5; 325 MG/1; MG/1
1 TABLET ORAL EVERY 8 HOURS PRN
Qty: 9 TABLET | Refills: 0 | Status: SHIPPED | OUTPATIENT
Start: 2022-10-03

## 2022-10-03 RX ORDER — CLONAZEPAM 0.5 MG/1
1 TABLET ORAL ONCE
Status: COMPLETED | OUTPATIENT
Start: 2022-10-03 | End: 2022-10-03

## 2022-10-03 RX ORDER — DOXYCYCLINE HYCLATE 100 MG
100 TABLET ORAL EVERY 12 HOURS
Qty: 14 TABLET | Refills: 0 | Status: SHIPPED | OUTPATIENT
Start: 2022-10-03 | End: 2022-10-03 | Stop reason: SDUPTHER

## 2022-10-03 RX ORDER — AMOXICILLIN AND CLAVULANATE POTASSIUM 875; 125 MG/1; MG/1
1 TABLET, FILM COATED ORAL EVERY 12 HOURS
Qty: 14 TABLET | Refills: 0 | Status: SHIPPED | OUTPATIENT
Start: 2022-10-03 | End: 2022-10-10

## 2022-10-03 RX ORDER — AMOXICILLIN AND CLAVULANATE POTASSIUM 875; 125 MG/1; MG/1
1 TABLET, FILM COATED ORAL EVERY 12 HOURS
Qty: 14 TABLET | Refills: 0 | Status: SHIPPED | OUTPATIENT
Start: 2022-10-03 | End: 2022-10-03 | Stop reason: SDUPTHER

## 2022-10-03 RX ORDER — DOXYCYCLINE HYCLATE 100 MG
100 TABLET ORAL EVERY 12 HOURS
Qty: 14 TABLET | Refills: 0 | Status: SHIPPED | OUTPATIENT
Start: 2022-10-03 | End: 2022-10-10

## 2022-10-03 RX ADMIN — VANCOMYCIN HYDROCHLORIDE 1000 MG: 1 INJECTION, POWDER, LYOPHILIZED, FOR SOLUTION INTRAVENOUS at 08:10

## 2022-10-03 RX ADMIN — POLYETHYLENE GLYCOL 3350 17 G: 17 POWDER, FOR SOLUTION ORAL at 08:10

## 2022-10-03 RX ADMIN — ONDANSETRON 4 MG: 2 INJECTION INTRAMUSCULAR; INTRAVENOUS at 10:10

## 2022-10-03 RX ADMIN — KETOROLAC TROMETHAMINE 15 MG: 30 INJECTION, SOLUTION INTRAMUSCULAR; INTRAVENOUS at 10:10

## 2022-10-03 RX ADMIN — CLONAZEPAM 1 MG: 0.5 TABLET ORAL at 09:10

## 2022-10-03 RX ADMIN — VANCOMYCIN HYDROCHLORIDE 1000 MG: 1 INJECTION, POWDER, LYOPHILIZED, FOR SOLUTION INTRAVENOUS at 01:10

## 2022-10-03 RX ADMIN — SENNOSIDES AND DOCUSATE SODIUM 1 TABLET: 50; 8.6 TABLET ORAL at 08:10

## 2022-10-03 NOTE — NURSING
Discharge instructions given to patient with copy of instructions, patient verbalized understanding of all instructions. [facility] pharmacy delivered 3 prescriptions to bedside. Waiting for friend to  for discharge home.

## 2022-10-03 NOTE — PLAN OF CARE
10/03/22 1549   Final Note   Assessment Type Final Discharge Note   Anticipated Discharge Disposition Home   Hospital Resources/Appts/Education Provided Provided patient/caregiver with written discharge plan information;Appointments scheduled and added to AVS   Post-Acute Status   Discharge Delays None known at this time     Pt states she lives independently at home.     Family to provide transportation home.    No DC needs from CM perspective.

## 2022-10-03 NOTE — SUBJECTIVE & OBJECTIVE
Interval History: Patient eager to go home this morning, discussed needing surgery input on penrose drain and following cultures. Ordered home klonopin for anxiety. WBC improved this Am, continue current antibiotics pending culture results    Review of Systems   Constitutional:  Positive for chills and fever.   HENT:  Negative for congestion and sore throat.    Eyes:  Negative for pain and redness.   Respiratory:  Negative for chest tightness and shortness of breath.    Cardiovascular:  Negative for chest pain and leg swelling.   Gastrointestinal:  Negative for abdominal distention, abdominal pain, diarrhea, nausea and vomiting.   Genitourinary:  Negative for difficulty urinating.   Musculoskeletal:  Positive for gait problem (walks with a limp due to left hip pain). Negative for joint swelling.   Skin:  Negative for rash and wound.   Neurological:  Negative for weakness and headaches.   Psychiatric/Behavioral:  Negative for agitation and behavioral problems.    Objective:     Vital Signs (Most Recent):  Temp: 97.5 °F (36.4 °C) (10/03/22 0800)  Pulse: 73 (10/03/22 0736)  Resp: 16 (10/03/22 0736)  BP: (!) 98/57 (10/03/22 0736)  SpO2: 97 % (10/03/22 0736)   Vital Signs (24h Range):  Temp:  [97.4 °F (36.3 °C)-98.5 °F (36.9 °C)] 97.5 °F (36.4 °C)  Pulse:  [] 73  Resp:  [16-18] 16  SpO2:  [97 %-98 %] 97 %  BP: ()/(55-72) 98/57     Weight: 58.9 kg (129 lb 13.6 oz)  Body mass index is 19.18 kg/m².    Intake/Output Summary (Last 24 hours) at 10/3/2022 1056  Last data filed at 10/3/2022 0418  Gross per 24 hour   Intake 840 ml   Output --   Net 840 ml      Physical Exam  Vitals and nursing note reviewed.   Constitutional:       Appearance: She is not ill-appearing.   HENT:      Head: Normocephalic and atraumatic.      Nose: No congestion or rhinorrhea.   Cardiovascular:      Rate and Rhythm: Normal rate and regular rhythm.   Pulmonary:      Effort: Pulmonary effort is normal.      Breath sounds: Normal breath  sounds.   Abdominal:      General: There is no distension.      Palpations: Abdomen is soft.      Comments:      Musculoskeletal:         General: Swelling (left hip) and tenderness (left area near the hip joint) present.      Cervical back: Normal range of motion and neck supple.      Right lower leg: No edema.      Left lower leg: No edema.   Skin:     General: Skin is warm and dry.      Findings: Bruising and erythema (left back, area of cellulitis; dressing with scant serosanguinous fluid) present.   Neurological:      General: No focal deficit present.      Mental Status: She is alert.   Psychiatric:         Mood and Affect: Mood normal.         Behavior: Behavior normal.       Significant Labs: All pertinent labs within the past 24 hours have been reviewed.    Significant Imaging: I have reviewed all pertinent imaging results/findings within the past 24 hours.

## 2022-10-03 NOTE — ASSESSMENT & PLAN NOTE
- Fever, chills and worsening pain and erythema for 3 days, worsening, with Tmax 102 at home and associated limitation to ambulation due to pain. Denies insect bite or trauma to left hip, denies recent dental work, hx of diabetes nor is she on long term steroids.   - HDS with Tmax 100.8 and tachycardia 103 . Leukocytosis 15.2 with elevated .3 and lactate 2.0. Started on IV Clindamycin 600mg q8. US done at ER and shown no fluid collection. Despite downtrending WBC to 13.51 today, patient reports persistent pain to left hip with increased erythema (see images).   - Extremely tender to touch, 10/10 with movement, no drainage, ROM to left hip limited by pain.  - Hypocalcemia 7.9, corrected for albumin - 8.5     Plan:  - MRI left hip without bone involvement, however appears they may be fluid collection forming  - Broaden abx cover to IV Ceftriaxone 2g qd and IV Vancomycin as per pharmacy- improving clinically  - surgery eval to assess fluid formation: I&D in OR 10/2, follow cultures; penrose drain left in place  - Blood cultures from admit growing GPR, repeat pending

## 2022-10-03 NOTE — ASSESSMENT & PLAN NOTE
Status post I&D   Okay to transition to oral antibiotics   Okay for discharge from surgical standpoint.

## 2022-10-03 NOTE — PROGRESS NOTES
Pharmacokinetic Assessment Follow Up: IV Vancomycin    Vancomycin serum concentration assessment(s):    The trough level was drawn incorrectly and can not be used to guide therapy at this time. IV lost and med was restarted approx 1300 this afternoon (per JUAN Barnes). Med charted stopped at 1440. Level drawn @ 1539.     Vancomycin Regimen Plan:  Med retimed. Level due at 2030 10/3/22. JUAN Barnes states pt to go home today. Level ordered if pt remains in hospital.     Drug levels (last 3 results):  Recent Labs   Lab Result Units 10/02/22  1403 10/03/22  1539   Vancomycin-Trough ug/mL 6.7* 29.6*       Pharmacy will continue to follow and monitor vancomycin.    Please contact pharmacy at extension [phone] for questions regarding this assessment.    Thank you for the consult,   Lindsey Albarran       Patient brief summary:  Roselyn Vieira is a 32 y.o. female initiated on antimicrobial therapy with IV Vancomycin for treatment of skin & soft tissue infection.    The patient's current regimen is 1000 mg every 8 hours.    Drug Allergies:   Review of patient's allergies indicates:  No Known Allergies    Actual Body Weight:   58.9 kg    Renal Function:   Estimated Creatinine Clearance: 107.3 mL/min (based on SCr of 0.7 mg/dL).,     Dialysis Method (if applicable):  N/A    CBC (last 72 hours):  Recent Labs   Lab Result Units 10/01/22  0451 10/02/22  0431 10/03/22  0551   WBC K/uL 14.32* 16.94* 6.57   Hemoglobin g/dL 10.9* 11.1* 10.6*   Hematocrit % 32.4* 32.6* 31.5*   Platelets K/uL 371 409 438   Gran % % 81.0* 80.1* 57.2   Lymph % % 8.9* 9.4* 26.2   Mono % % 8.4 8.7 12.3   Eosinophil % % 0.4 0.5 3.0   Basophil % % 0.1 0.2 0.5   Differential Method  Automated Automated Automated       Metabolic Panel (last 72 hours):  Recent Labs   Lab Result Units 10/01/22  0451 10/02/22  0431 10/03/22  0551   Sodium mmol/L 135* 135* 138   Potassium mmol/L 3.5 3.6 3.9   Chloride mmol/L 108 107 109   CO2 mmol/L 21* 22* 24   Glucose mg/dL 136* 111*  93   BUN mg/dL 3* 5* 6   Creatinine mg/dL 0.7 0.7 0.7   Albumin g/dL 2.7* 2.7* 2.6*   Total Bilirubin mg/dL 0.2 0.3 0.2   Alkaline Phosphatase U/L 67 77 67   AST U/L 11 14 13   ALT U/L 6* 7* 9*       Vancomycin Administrations:  vancomycin given in the last 96 hours                     vancomycin in dextrose 5 % 1 gram/250 mL IVPB 1,000 mg (mg) 1,000 mg New Bag 10/03/22 0841     1,000 mg New Bag  0106     1,000 mg New Bag 10/02/22 1622    vancomycin in dextrose 5 % 1 gram/250 mL IVPB 1,000 mg (mg) 1,000 mg New Bag 10/02/22 0405     1,000 mg New Bag 10/01/22 1531     1,000 mg New Bag  0246    vancomycin 1.5 g in dextrose 5 % 250 mL IVPB (ready to mix) (mg) 1,500 mg New Bag 09/30/22 1533                    Microbiologic Results:  Microbiology Results (last 7 days)       Procedure Component Value Units Date/Time    AFB Culture & Smear [[id number]] Collected: 10/02/22 0844    Order Status: Completed Specimen: Incision site from Hip, Left Updated: 10/03/22 1531     AFB CULTURE STAIN No acid fast bacilli seen.    Narrative:      Left hip incision for abscess    Blood culture x two cultures. Draw prior to antibiotics. [[id number]]  (Abnormal) Collected: 09/29/22 1628    Order Status: Completed Specimen: Blood from Peripheral, Antecubital, Right Updated: 10/03/22 1334     Blood Culture, Routine Gram stain yobani bottle: Gram positive rods      Results called to and read back by: Yasmine Levy RN 10/01/2022  10:06      BACILLUS SPECIES    Narrative:      Aerobic and anaerobic    Blood culture [[id number]] Collected: 10/01/22 1202    Order Status: Completed Specimen: Blood from Antecubital, Left Arm Updated: 10/02/22 2312     Blood Culture, Routine No growth to date      No Growth to date    Blood culture [[id number]] Collected: 10/01/22 1202    Order Status: Completed Specimen: Blood from Antecubital, Right Arm Updated: 10/02/22 2312     Blood Culture, Routine No growth to date      No Growth to date    Culture, Anaerobic  [[id number]] Collected: 10/02/22 0844    Order Status: Sent Specimen: Incision site from Hip, Left Updated: 10/02/22 2233    Fungus culture [[id number]] Collected: 10/02/22 0844    Order Status: Sent Specimen: Incision site from Hip, Left Updated: 10/02/22 2233    Aerobic culture [[id number]] Collected: 10/02/22 0844    Order Status: Sent Specimen: Incision site from Hip, Left Updated: 10/02/22 2233    Blood culture x two cultures. Draw prior to antibiotics. [[id number]] Collected: 09/29/22 1631    Order Status: Completed Specimen: Blood from Wrist, Left Updated: 10/02/22 2212     Blood Culture, Routine No Growth to date      No Growth to date      No Growth to date      No Growth to date    Narrative:      Aerobic and anaerobic

## 2022-10-03 NOTE — PLAN OF CARE
POC reviewed with patient. AAOx4. VS stable on room air. Complaints of pain treated with PRN  according to MAR. No other complaints verbalized during shift.  No injuries, falls, or trauma occurred during shift. Purposeful rounding completed. Bed low and locked with side rails up x 3 and call light within reach.

## 2022-10-03 NOTE — PROGRESS NOTES
[facility] - Med Surg ([place])  General Surgery  Progress Note    Subjective:     History of Present Illness:  32 year old female with previous smoking history, ADHD presenting with 3 day onset of left hip cellulitis with associated fever, chills and worsening pain and erythema. Patient denies remembering any insect bite or trauma to left hip, but would be a possibility of getting it as she is outdoors most of the time with her dog and working as a . Left hip soreness started 3 days PTA, then fever Tmax 102 the next day and increasing area of erythema 1 day PTA with limitations to ambulation due to severe pain. Patient reports walking with a limp due to pain. Patient denies headache, nausea, vomiting, diarrhea, chest pain, S      Post-Op Info:  Procedure(s) (LRB):  INCISION AND DRAINAGE, ABSCESS (Left)   1 Day Post-Op     Interval History:  Postop day 1 status post I&D of left hip abscess   Patient doing well.    Leukocytosis improved.    Penrose drain still in place   Okay for discharge     Medications:  Continuous Infusions:  Scheduled Meds:   cefTRIAXone (ROCEPHIN) IVPB  2 g Intravenous Q24H    enoxaparin  40 mg Subcutaneous Daily    lisdexamfetamine  20 mg Oral QAM    polyethylene glycol  17 g Oral Daily    QUEtiapine  400 mg Oral QHS    senna-docusate 8.6-50 mg  1 tablet Oral BID    vancomycin (VANCOCIN) IVPB  1,000 mg Intravenous Q8H     PRN Meds:acetaminophen, aluminum-magnesium hydroxide-simethicone, bisacodyL, HYDROcodone-acetaminophen, ketorolac, melatonin, morphine, naloxone, ondansetron, sodium chloride 0.9%, sodium chloride 0.9%, Pharmacy to dose Vancomycin consult **AND** vancomycin - pharmacy to dose     Review of patient's allergies indicates:  No Known Allergies  Objective:     Vital Signs (Most Recent):  Temp: 97.5 °F (36.4 °C) (10/03/22 1130)  Pulse: 97 (10/03/22 1130)  Resp: 16 (10/03/22 1130)  BP: 117/70 (10/03/22 1130)  SpO2: 97 % (10/03/22 1130) Vital Signs (24h Range):  Temp:   [97.4 °F (36.3 °C)-98.5 °F (36.9 °C)] 97.5 °F (36.4 °C)  Pulse:  [71-97] 97  Resp:  [16-18] 16  SpO2:  [97 %-98 %] 97 %  BP: ()/(55-72) 117/70     Weight: 58.9 kg (129 lb 13.6 oz)  Body mass index is 19.18 kg/m².    Intake/Output - Last 3 Shifts         10/01 0700  10/02 0659 10/02 0700  10/03 0659 10/03 0700  10/04 0659    P.O. 180 840     I.V. (mL/kg)  900 (15.3)     Total Intake(mL/kg) 180 (3.1) 1740 (29.5)     Urine (mL/kg/hr)  800 (0.6)     Stool  0     Total Output  800     Net +180 +940            Urine Occurrence 2 x 3 x     Stool Occurrence 0 x 0 x 0 x            Physical Exam  Vitals and nursing note reviewed.   Constitutional:       Appearance: She is well-developed.   HENT:      Head: Normocephalic and atraumatic.   Cardiovascular:      Rate and Rhythm: Normal rate.      Heart sounds: Normal heart sounds.   Pulmonary:      Effort: Pulmonary effort is normal.   Abdominal:      General: Bowel sounds are normal. There is no distension.      Palpations: Abdomen is soft.      Tenderness: There is no abdominal tenderness.   Musculoskeletal:         General: Normal range of motion.      Cervical back: Normal range of motion.   Skin:     General: Skin is warm and dry.      Capillary Refill: Capillary refill takes less than 2 seconds.             Comments: Penrose drain in place   Dressing change.    Packing all removed.       Neurological:      Mental Status: She is alert and oriented to person, place, and time.   Psychiatric:         Behavior: Behavior normal.       Significant Labs:  I have reviewed all pertinent lab results within the past 24 hours.  CBC:   Recent Labs   Lab 10/03/22  0551   WBC 6.57   RBC 3.52*   HGB 10.6*   HCT 31.5*      MCV 90   MCH 30.1   MCHC 33.7     CMP:   Recent Labs   Lab 10/03/22  0551   GLU 93   CALCIUM 8.9   ALBUMIN 2.6*   PROT 6.1      K 3.9   CO2 24      BUN 6   CREATININE 0.7   ALKPHOS 67   ALT 9*   AST 13   BILITOT 0.2         Assessment/Plan:     *  Cellulitis of back except buttock  Status post I&D   Okay to transition to oral antibiotics   Okay for discharge from surgical standpoint.              Demar Raymundo MD  General Surgery  [facility] - Med Surg ([place])

## 2022-10-03 NOTE — PROGRESS NOTES
[facility] Surg ([place] [facility] Medicine  Progress Note    Patient Name: Roselyn Vieira  MRN: [id number]  Patient Class: IP- Inpatient   Admission Date: 9/29/2022  Length of Stay: 1 days  Attending Physician: Mauricio Montero MD  Primary Care Provider: Primary Doctor No        Subjective:     Principal Problem:Cellulitis of back except buttock        HPI:  Roselyn Vieira is a 32 year old female with previous smoking history, ADHD presenting with 3 day onset of left hip cellulitis with associated fever, chills and worsening pain and erythema. Patient denies remembering any insect bite or trauma to left hip, but would be a possibility of getting it as she is outdoors most of the time with her dog and working as a . Left hip soreness started 3 days PTA, then fever Tmax 102 the next day and increasing area of erythema 1 day PTA with limitations to ambulation due to severe pain. Patient reports walking with a limp due to pain. Patient denies headache, nausea, vomiting, diarrhea, chest pain, SOB. Patient also denies recent dental work, hx of diabetes nor is she on long term steroids.    HDS with Tmax 100.8. Leukocytosis 15.2 with elevated .3 and lactate 2.0. Started on IV Clindamycin 600mg q8. US done at ER and shown no fluid collection. Despite downtrending WBC to 13.51 today, patient reports persistent pain to left hip with increased erythema (see images).     Patient is upgraded to Observation status and will be managed by Hospital Medicine.       Overview/Hospital Course:  Roselyn Vieira was admitted for cellulitis of her L hip. Febrile and with leukocytosis on admission. Started on Clindamycin but did not improve, switched to IV Vanc and CTX. MRI pelvis with possible fluid formation, surgery consulted, taken to OR 10/2 for I&D and penrose drain placement, follow cultures. Bcx with GPR, repeat ordered.         Interval History: Patient eager to go home this morning, discussed needing surgery  input on penrose drain and following cultures. Ordered home klonopin for anxiety. WBC improved this Am, continue current antibiotics pending culture results    Review of Systems   Constitutional:  Positive for chills and fever.   HENT:  Negative for congestion and sore throat.    Eyes:  Negative for pain and redness.   Respiratory:  Negative for chest tightness and shortness of breath.    Cardiovascular:  Negative for chest pain and leg swelling.   Gastrointestinal:  Negative for abdominal distention, abdominal pain, diarrhea, nausea and vomiting.   Genitourinary:  Negative for difficulty urinating.   Musculoskeletal:  Positive for gait problem (walks with a limp due to left hip pain). Negative for joint swelling.   Skin:  Negative for rash and wound.   Neurological:  Negative for weakness and headaches.   Psychiatric/Behavioral:  Negative for agitation and behavioral problems.    Objective:     Vital Signs (Most Recent):  Temp: 97.5 °F (36.4 °C) (10/03/22 0800)  Pulse: 73 (10/03/22 0736)  Resp: 16 (10/03/22 0736)  BP: (!) 98/57 (10/03/22 0736)  SpO2: 97 % (10/03/22 0736)   Vital Signs (24h Range):  Temp:  [97.4 °F (36.3 °C)-98.5 °F (36.9 °C)] 97.5 °F (36.4 °C)  Pulse:  [] 73  Resp:  [16-18] 16  SpO2:  [97 %-98 %] 97 %  BP: ()/(55-72) 98/57     Weight: 58.9 kg (129 lb 13.6 oz)  Body mass index is 19.18 kg/m².    Intake/Output Summary (Last 24 hours) at 10/3/2022 1056  Last data filed at 10/3/2022 0418  Gross per 24 hour   Intake 840 ml   Output --   Net 840 ml      Physical Exam  Vitals and nursing note reviewed.   Constitutional:       Appearance: She is not ill-appearing.   HENT:      Head: Normocephalic and atraumatic.      Nose: No congestion or rhinorrhea.   Cardiovascular:      Rate and Rhythm: Normal rate and regular rhythm.   Pulmonary:      Effort: Pulmonary effort is normal.      Breath sounds: Normal breath sounds.   Abdominal:      General: There is no distension.      Palpations: Abdomen is  soft.      Comments:      Musculoskeletal:         General: Swelling (left hip) and tenderness (left area near the hip joint) present.      Cervical back: Normal range of motion and neck supple.      Right lower leg: No edema.      Left lower leg: No edema.   Skin:     General: Skin is warm and dry.      Findings: Bruising and erythema (left back, area of cellulitis; dressing with scant serosanguinous fluid) present.   Neurological:      General: No focal deficit present.      Mental Status: She is alert.   Psychiatric:         Mood and Affect: Mood normal.         Behavior: Behavior normal.       Significant Labs: All pertinent labs within the past 24 hours have been reviewed.    Significant Imaging: I have reviewed all pertinent imaging results/findings within the past 24 hours.      Assessment/Plan:      * Cellulitis of back except buttock  - Fever, chills and worsening pain and erythema for 3 days, worsening, with Tmax 102 at home and associated limitation to ambulation due to pain. Denies insect bite or trauma to left hip, denies recent dental work, hx of diabetes nor is she on long term steroids.   - HDS with Tmax 100.8 and tachycardia 103 . Leukocytosis 15.2 with elevated .3 and lactate 2.0. Started on IV Clindamycin 600mg q8. US done at ER and shown no fluid collection. Despite downtrending WBC to 13.51 today, patient reports persistent pain to left hip with increased erythema (see images).   - Extremely tender to touch, 10/10 with movement, no drainage, ROM to left hip limited by pain.  - Hypocalcemia 7.9, corrected for albumin - 8.5     Plan:  - MRI left hip without bone involvement, however appears they may be fluid collection forming  - Broaden abx cover to IV Ceftriaxone 2g qd and IV Vancomycin as per pharmacy- improving clinically  - surgery eval to assess fluid formation: I&D in OR 10/2, follow cultures; penrose drain left in place  - Blood cultures from admit growing GPR, repeat  pending      ADHD  Continue home dose lisdexamfetamine 20mg qd      Bipolar disorder  Resume home dose quetiapine 400mg qd    Nicotine dependence due to vaping tobacco product  Previous 1 pack a day smoker of ?years, currently vapes   Counseled to quit, amenable; spoke with patient for 3 min    - Continue nicotine replacement therapy 21mg/24hr        VTE Risk Mitigation (From admission, onward)         Ordered     enoxaparin injection 40 mg  Daily         09/30/22 1541     IP VTE HIGH RISK PATIENT  Once         09/30/22 1541     Place sequential compression device  Until discontinued         09/30/22 1541     Place ANDREW hose  Until discontinued         09/29/22 1756                Discharge Planning   BOBBY:      Code Status: Full Code   Is the patient medically ready for discharge?:     Reason for patient still in hospital (select all that apply): Patient trending condition, Laboratory test and Consult recommendations  Discharge Plan A: Home                  Halley Castle PA-C  Department of Hospital Medicine   [facility] Surg ([place])

## 2022-10-04 ENCOUNTER — TELEPHONE (OUTPATIENT)
Dept: SURGERY | Facility: CLINIC | Age: 32
End: 2022-10-04
Payer: MEDICAID

## 2022-10-04 LAB — BACTERIA BLD CULT: NORMAL

## 2022-10-04 PROCEDURE — 99239 HOSP IP/OBS DSCHRG MGMT >30: CPT | Mod: ,,, | Performed by: PHYSICIAN ASSISTANT

## 2022-10-04 PROCEDURE — G0378 HOSPITAL OBSERVATION PER HR: HCPCS

## 2022-10-04 PROCEDURE — 99239 PR HOSPITAL DISCHARGE DAY,>30 MIN: ICD-10-PCS | Mod: ,,, | Performed by: PHYSICIAN ASSISTANT

## 2022-10-05 ENCOUNTER — PATIENT OUTREACH (OUTPATIENT)
Dept: ADMINISTRATIVE | Facility: CLINIC | Age: 32
End: 2022-10-05
Payer: MEDICAID

## 2022-10-05 LAB
BACTERIA SPEC AEROBE CULT: ABNORMAL
BACTERIA SPEC AEROBE CULT: ABNORMAL

## 2022-10-05 NOTE — ASSESSMENT & PLAN NOTE
- Fever, chills and worsening pain and erythema for 3 days, worsening, with Tmax 102 at home and associated limitation to ambulation due to pain. Denies insect bite or trauma to left hip, denies recent dental work, hx of diabetes nor is she on long term steroids.   - HDS with Tmax 100.8 and tachycardia 103 . Leukocytosis 15.2 with elevated .3 and lactate 2.0. Started on IV Clindamycin 600mg q8. US done at ER and shown no fluid collection. Despite downtrending WBC to 13.51 today, patient reports persistent pain to left hip with increased erythema (see images).   - Extremely tender to touch, 10/10 with movement, no drainage, ROM to left hip limited by pain.  - Hypocalcemia 7.9, corrected for albumin - 8.5     Plan:  - MRI left hip without bone involvement, however appears they may be fluid collection forming  - Broaden abx cover to IV Ceftriaxone 2g qd and IV Vancomycin as per pharmacy- improving clinically  - surgery eval to assess fluid formation: I&D in OR 10/2, follow cultures; penrose drain left in place  - Blood cultures from admit growing GPR, repeatngtd  - ok to leave drain in place and f/u in clinic per surgery  - stable for dc with augmentin and doxy, return rpecautions discussed, no further questions at dc

## 2022-10-05 NOTE — PROGRESS NOTES
C3 nurse attempted to contact Roselyn Vieira for a TCC post hospital discharge follow up call. No answer. The patient does not have a scheduled HOSFU appointment, and the pt does not have an [facility] PCP.

## 2022-10-05 NOTE — DISCHARGE SUMMARY
[facility], [place], operated by [facility] Surg [facility] Medicine  Discharge Summary      Patient Name: Roselyn Vieira  MRN: [id number]  Patient Class: IP- Inpatient  Admission Date: 9/29/2022  Hospital Length of Stay: 2 days  Discharge Date and Time: 10/4/2022  8:44 AM  Attending Physician: No att. providers found   Discharging Provider: Halley Castle PA-C  Primary Care Provider: Primary Doctor Candi      HPI:   Roselyn Vieira is a 32 year old female with previous smoking history, ADHD presenting with 3 day onset of left hip cellulitis with associated fever, chills and worsening pain and erythema. Patient denies remembering any insect bite or trauma to left hip, but would be a possibility of getting it as she is outdoors most of the time with her dog and working as a . Left hip soreness started 3 days PTA, then fever Tmax 102 the next day and increasing area of erythema 1 day PTA with limitations to ambulation due to severe pain. Patient reports walking with a limp due to pain. Patient denies headache, nausea, vomiting, diarrhea, chest pain, SOB. Patient also denies recent dental work, hx of diabetes nor is she on long term steroids.    HDS with Tmax 100.8. Leukocytosis 15.2 with elevated .3 and lactate 2.0. Started on IV Clindamycin 600mg q8. US done at ER and shown no fluid collection. Despite downtrending WBC to 13.51 today, patient reports persistent pain to left hip with increased erythema (see images).     Patient is upgraded to Observation status and will be managed by Hospital Medicine.       Procedure(s) (LRB):  INCISION AND DRAINAGE, ABSCESS (Left)      Hospital Course:   Roselyn Vieira was admitted for cellulitis of her L hip. Febrile and with leukocytosis on admission. Started on Clindamycin but did not improve, switched to IV Vanc and CTX. MRI pelvis with possible fluid formation, surgery consulted, taken to OR 10/2 for I&D and penrose drain placement (left in place, f/u with surgery to remove),  follow cultures. Bcx on admission grew bacillus in one bottle, repeats NGTD. Suspected contaminant  Patient stable for dc with augmentin and doxy, surgery follow up. Return precautions discussed, no further questions at dc       Goals of Care Treatment Preferences:  Code Status: Full Code      Consults:   Consults (From admission, onward)        Status Ordering Provider     Inpatient consult to General Surgery  Once        Provider:  Demar Raymundo MD    Completed GARETH ESPINOSA          * Cellulitis of back except buttock  - Fever, chills and worsening pain and erythema for 3 days, worsening, with Tmax 102 at home and associated limitation to ambulation due to pain. Denies insect bite or trauma to left hip, denies recent dental work, hx of diabetes nor is she on long term steroids.   - HDS with Tmax 100.8 and tachycardia 103 . Leukocytosis 15.2 with elevated .3 and lactate 2.0. Started on IV Clindamycin 600mg q8. US done at ER and shown no fluid collection. Despite downtrending WBC to 13.51 today, patient reports persistent pain to left hip with increased erythema (see images).   - Extremely tender to touch, 10/10 with movement, no drainage, ROM to left hip limited by pain.  - Hypocalcemia 7.9, corrected for albumin - 8.5     Plan:  - MRI left hip without bone involvement, however appears they may be fluid collection forming  - Broaden abx cover to IV Ceftriaxone 2g qd and IV Vancomycin as per pharmacy- improving clinically  - surgery eval to assess fluid formation: I&D in OR 10/2, follow cultures; penrose drain left in place  - Blood cultures from admit growing GPR, repeatngtd  - ok to leave drain in place and f/u in clinic per surgery  - stable for dc with augmentin and doxy, return rpecautions discussed, no further questions at dc        Final Active Diagnoses:    Diagnosis Date Noted POA    PRINCIPAL PROBLEM:  Cellulitis of back except buttock [L03.312] 09/29/2022 Yes    Nicotine  dependence due to vaping tobacco product [F17.290] 09/30/2022 Yes    Bipolar disorder [F31.9] 09/30/2022 Yes     Chronic    ADHD [F90.9] 09/30/2022 Yes     Chronic      Problems Resolved During this Admission:       Discharged Condition: stable    Disposition: Home or Self Care    Follow Up:    Patient Instructions:      Notify your health care provider if you experience any of the following:  temperature >100.4     Notify your health care provider if you experience any of the following:  persistent nausea and vomiting or diarrhea     Notify your health care provider if you experience any of the following:  severe uncontrolled pain     Notify your health care provider if you experience any of the following:  redness, tenderness, or signs of infection (pain, swelling, redness, odor or green/yellow discharge around incision site)     Notify your health care provider if you experience any of the following:  worsening rash     Notify your health care provider if you experience any of the following:  persistent dizziness, light-headedness, or visual disturbances     Notify your health care provider if you experience any of the following:  increased confusion or weakness     Activity as tolerated       Significant Diagnostic Studies: Radiology: MRI: No evidence for osteomyelitis.     5.4 x 3.3 cm rim enhancing fluid collection LEFT gluteal region with adjacent subcutaneous and muscular (reactive myositis) edema.  Early abscess formation not excluded.       Pending Diagnostic Studies:     None         Medications:  Reconciled Home Medications:      Medication List      START taking these medications    amoxicillin-clavulanate 875-125mg 875-125 mg per tablet  Commonly known as: AUGMENTIN  Take 1 tablet by mouth every 12 (twelve) hours. for 7 days     doxycycline 100 MG tablet  Commonly known as: VIBRA-TABS  Take 1 tablet (100 mg total) by mouth every 12 (twelve) hours. for 7 days     HYDROcodone-acetaminophen 5-325 mg per  tablet  Commonly known as: NORCO  Take 1 tablet by mouth every 8 (eight) hours as needed for Pain.        CONTINUE taking these medications    clonazePAM 1 MG tablet  Commonly known as: KlonoPIN  Take 1 mg by mouth nightly as needed.     QUEtiapine 400 MG Tb24  Commonly known as: SEROQUEL XR  Take 400 mg by mouth every evening.     VYVANSE 20 MG capsule  Generic drug: lisdexamfetamine  Take 20 mg by mouth every morning.            Indwelling Lines/Drains at time of discharge:   Lines/Drains/Airways     None                 Time spent on the discharge of patient: >45 minutes       Discussed with staff  Halley Castle PA-C  Department of Hospital Medicine  [facility] Surg ([facility])

## 2022-10-06 LAB
BACTERIA BLD CULT: NORMAL
BACTERIA BLD CULT: NORMAL

## 2022-10-07 LAB — BACTERIA SPEC ANAEROBE CULT: ABNORMAL

## 2022-10-13 ENCOUNTER — OFFICE VISIT (OUTPATIENT)
Dept: SURGERY | Facility: CLINIC | Age: 32
End: 2022-10-13
Payer: MEDICAID

## 2022-10-13 DIAGNOSIS — Z98.890 POST-OPERATIVE STATE: Primary | ICD-10-CM

## 2022-10-13 PROCEDURE — 1159F MED LIST DOCD IN RCRD: CPT | Mod: CPTII,95,, | Performed by: SURGERY

## 2022-10-13 PROCEDURE — 99024 PR POST-OP FOLLOW-UP VISIT: ICD-10-PCS | Mod: 95,,, | Performed by: SURGERY

## 2022-10-13 PROCEDURE — 1160F RVW MEDS BY RX/DR IN RCRD: CPT | Mod: CPTII,95,, | Performed by: SURGERY

## 2022-10-13 PROCEDURE — 1160F PR REVIEW ALL MEDS BY PRESCRIBER/CLIN PHARMACIST DOCUMENTED: ICD-10-PCS | Mod: CPTII,95,, | Performed by: SURGERY

## 2022-10-13 PROCEDURE — 1159F PR MEDICATION LIST DOCUMENTED IN MEDICAL RECORD: ICD-10-PCS | Mod: CPTII,95,, | Performed by: SURGERY

## 2022-10-13 PROCEDURE — 99024 POSTOP FOLLOW-UP VISIT: CPT | Mod: 95,,, | Performed by: SURGERY

## 2022-10-25 NOTE — PROGRESS NOTES
Established Patient - Audio Only Telehealth Visit     The patient location is:  Home  The chief complaint leading to consultation is:  Postop  Visit type: Virtual visit with audio only (telephone)  Total time spent with patient:  5       The reason for the audio only service rather than synchronous audio and video virtual visit was related to technical difficulties or patient preference/necessity.     Each patient to whom I provide medical services by telemedicine is:  (1) informed of the relationship between the physician and patient and the respective role of any other health care provider with respect to management of the patient; and (2) notified that they may decline to receive medical services by telemedicine and may withdraw from such care at any time. Patient verbally consented to receive this service via voice-only telephone call.       HPI:  32-year-old female 2 weeks status post incision and drainage of hip abscess.    Doing well.    Pain well controlled.    Penrose drain has been removed.         Assessment and plan:  Status post abscess drainage.    All drains removed.    Return to clinic p.r.n.                           This service was not originating from a related E/M service provided within the previous 7 days nor will  to an E/M service or procedure within the next 24 hours or my soonest available appointment.  Prevailing standard of care was able to be met in this audio-only visit.

## 2022-10-29 ENCOUNTER — HOSPITAL ENCOUNTER (EMERGENCY)
Facility: OTHER | Age: 32
Discharge: HOME | End: 2022-10-29
Attending: EMERGENCY MEDICINE
Payer: MEDICAID

## 2022-10-29 VITALS
TEMPERATURE: 98 F | HEART RATE: 71 BPM | HEIGHT: 69 IN | SYSTOLIC BLOOD PRESSURE: 105 MMHG | RESPIRATION RATE: 17 BRPM | OXYGEN SATURATION: 99 % | BODY MASS INDEX: 19.26 KG/M2 | DIASTOLIC BLOOD PRESSURE: 66 MMHG | WEIGHT: 130 LBS

## 2022-10-29 DIAGNOSIS — R45.851 SUICIDAL IDEATION: Primary | ICD-10-CM

## 2022-10-29 DIAGNOSIS — Z72.0 NICOTINE ABUSE: ICD-10-CM

## 2022-10-29 DIAGNOSIS — F10.920 ALCOHOLIC INTOXICATION WITHOUT COMPLICATION: ICD-10-CM

## 2022-10-29 DIAGNOSIS — F11.10 HEROIN ABUSE: ICD-10-CM

## 2022-10-29 DIAGNOSIS — F15.10 METHAMPHETAMINE ABUSE: ICD-10-CM

## 2022-10-29 DIAGNOSIS — F11.20 OPIATE DEPENDENCE, CONTINUOUS: ICD-10-CM

## 2022-10-29 LAB
ALBUMIN SERPL BCP-MCNC: 4 G/DL (ref 3.5–5.2)
ALP SERPL-CCNC: 65 U/L (ref 55–135)
ALT SERPL W/O P-5'-P-CCNC: 10 U/L (ref 10–44)
AMPHET+METHAMPHET UR QL: ABNORMAL
ANION GAP SERPL CALC-SCNC: 9 MMOL/L (ref 8–16)
APAP SERPL-MCNC: <3 UG/ML (ref 10–20)
AST SERPL-CCNC: 20 U/L (ref 10–40)
B-HCG UR QL: NEGATIVE
BARBITURATES UR QL SCN>200 NG/ML: NEGATIVE
BASOPHILS # BLD AUTO: 0.03 K/UL (ref 0–0.2)
BASOPHILS NFR BLD: 0.4 % (ref 0–1.9)
BENZODIAZ UR QL SCN>200 NG/ML: NEGATIVE
BILIRUB SERPL-MCNC: 0.4 MG/DL (ref 0.1–1)
BILIRUB UR QL STRIP: NEGATIVE
BUN SERPL-MCNC: 9 MG/DL (ref 6–20)
BZE UR QL SCN: NEGATIVE
CALCIUM SERPL-MCNC: 8.6 MG/DL (ref 8.7–10.5)
CANNABINOIDS UR QL SCN: ABNORMAL
CHLORIDE SERPL-SCNC: 112 MMOL/L (ref 95–110)
CLARITY UR: CLEAR
CO2 SERPL-SCNC: 20 MMOL/L (ref 23–29)
COLOR UR: YELLOW
CREAT SERPL-MCNC: 0.8 MG/DL (ref 0.5–1.4)
CREAT UR-MCNC: 35.1 MG/DL (ref 15–325)
CTP QC/QA: YES
CTP QC/QA: YES
DIFFERENTIAL METHOD: ABNORMAL
EOSINOPHIL # BLD AUTO: 0.2 K/UL (ref 0–0.5)
EOSINOPHIL NFR BLD: 2.3 % (ref 0–8)
ERYTHROCYTE [DISTWIDTH] IN BLOOD BY AUTOMATED COUNT: 15.1 % (ref 11.5–14.5)
EST. GFR  (NO RACE VARIABLE): >60 ML/MIN/1.73 M^2
ETHANOL SERPL-MCNC: 236 MG/DL
ETHANOL SERPL-MCNC: 89 MG/DL
GLUCOSE SERPL-MCNC: 107 MG/DL (ref 70–110)
GLUCOSE UR QL STRIP: NEGATIVE
HCT VFR BLD AUTO: 35.2 % (ref 37–48.5)
HGB BLD-MCNC: 11.7 G/DL (ref 12–16)
HGB UR QL STRIP: ABNORMAL
IMM GRANULOCYTES # BLD AUTO: 0.02 K/UL (ref 0–0.04)
IMM GRANULOCYTES NFR BLD AUTO: 0.3 % (ref 0–0.5)
KETONES UR QL STRIP: NEGATIVE
LEUKOCYTE ESTERASE UR QL STRIP: NEGATIVE
LYMPHOCYTES # BLD AUTO: 2 K/UL (ref 1–4.8)
LYMPHOCYTES NFR BLD: 29.4 % (ref 18–48)
MCH RBC QN AUTO: 29.9 PG (ref 27–31)
MCHC RBC AUTO-ENTMCNC: 33.2 G/DL (ref 32–36)
MCV RBC AUTO: 90 FL (ref 82–98)
METHADONE UR QL SCN>300 NG/ML: NEGATIVE
MONOCYTES # BLD AUTO: 0.6 K/UL (ref 0.3–1)
MONOCYTES NFR BLD: 9.2 % (ref 4–15)
NEUTROPHILS # BLD AUTO: 4 K/UL (ref 1.8–7.7)
NEUTROPHILS NFR BLD: 58.4 % (ref 38–73)
NITRITE UR QL STRIP: NEGATIVE
NRBC BLD-RTO: 0 /100 WBC
OPIATES UR QL SCN: NEGATIVE
PCP UR QL SCN>25 NG/ML: NEGATIVE
PH UR STRIP: 6 [PH] (ref 5–8)
PLATELET # BLD AUTO: 279 K/UL (ref 150–450)
PMV BLD AUTO: 9.9 FL (ref 9.2–12.9)
POTASSIUM SERPL-SCNC: 3.3 MMOL/L (ref 3.5–5.1)
PROT SERPL-MCNC: 7 G/DL (ref 6–8.4)
PROT UR QL STRIP: NEGATIVE
RBC # BLD AUTO: 3.91 M/UL (ref 4–5.4)
SARS-COV-2 RDRP RESP QL NAA+PROBE: NEGATIVE
SODIUM SERPL-SCNC: 141 MMOL/L (ref 136–145)
SP GR UR STRIP: <=1.005 (ref 1–1.03)
TOXICOLOGY INFORMATION: ABNORMAL
TSH SERPL DL<=0.005 MIU/L-ACNC: 2.39 UIU/ML (ref 0.4–4)
URN SPEC COLLECT METH UR: ABNORMAL
UROBILINOGEN UR STRIP-ACNC: NEGATIVE EU/DL
WBC # BLD AUTO: 6.88 K/UL (ref 3.9–12.7)

## 2022-10-29 PROCEDURE — 82077 ASSAY SPEC XCP UR&BREATH IA: CPT | Performed by: EMERGENCY MEDICINE

## 2022-10-29 PROCEDURE — 84443 ASSAY THYROID STIM HORMONE: CPT | Performed by: EMERGENCY MEDICINE

## 2022-10-29 PROCEDURE — 80143 DRUG ASSAY ACETAMINOPHEN: CPT | Performed by: EMERGENCY MEDICINE

## 2022-10-29 PROCEDURE — 99205 PR OFFICE/OUTPT VISIT, NEW, LEVL V, 60-74 MIN: ICD-10-PCS | Mod: 95,AF,HB, | Performed by: PSYCHIATRY & NEUROLOGY

## 2022-10-29 PROCEDURE — 87635 SARS-COV-2 COVID-19 AMP PRB: CPT | Performed by: EMERGENCY MEDICINE

## 2022-10-29 PROCEDURE — 80307 DRUG TEST PRSMV CHEM ANLYZR: CPT | Performed by: EMERGENCY MEDICINE

## 2022-10-29 PROCEDURE — 81025 URINE PREGNANCY TEST: CPT | Performed by: EMERGENCY MEDICINE

## 2022-10-29 PROCEDURE — 80053 COMPREHEN METABOLIC PANEL: CPT | Performed by: EMERGENCY MEDICINE

## 2022-10-29 PROCEDURE — 82077 ASSAY SPEC XCP UR&BREATH IA: CPT | Mod: 91 | Performed by: EMERGENCY MEDICINE

## 2022-10-29 PROCEDURE — 81003 URINALYSIS AUTO W/O SCOPE: CPT | Performed by: EMERGENCY MEDICINE

## 2022-10-29 PROCEDURE — 25000003 PHARM REV CODE 250: Performed by: EMERGENCY MEDICINE

## 2022-10-29 PROCEDURE — 99205 OFFICE O/P NEW HI 60 MIN: CPT | Mod: 95,AF,HB, | Performed by: PSYCHIATRY & NEUROLOGY

## 2022-10-29 PROCEDURE — 85025 COMPLETE CBC W/AUTO DIFF WBC: CPT | Performed by: EMERGENCY MEDICINE

## 2022-10-29 PROCEDURE — 99285 EMERGENCY DEPT VISIT HI MDM: CPT | Mod: 25

## 2022-10-29 PROCEDURE — S4991 NICOTINE PATCH NONLEGEND: HCPCS | Performed by: EMERGENCY MEDICINE

## 2022-10-29 RX ORDER — IBUPROFEN 200 MG
1 TABLET ORAL DAILY
Status: DISCONTINUED | OUTPATIENT
Start: 2022-10-29 | End: 2022-10-29 | Stop reason: HOSPADM

## 2022-10-29 RX ORDER — ACETAMINOPHEN 500 MG
1000 TABLET ORAL
Status: COMPLETED | OUTPATIENT
Start: 2022-10-29 | End: 2022-10-29

## 2022-10-29 RX ADMIN — Medication 1 PATCH: at 10:10

## 2022-10-29 RX ADMIN — ACETAMINOPHEN 1000 MG: 500 TABLET, FILM COATED ORAL at 03:10

## 2022-10-29 NOTE — ED NOTES
1st contact with pt, bedside report received from nurse Luis RN, appears to be sleeping, eyes closed, resp even non labored, psych tech at bedside with constant visual of pt. Completing [id number] psych check, see documentation

## 2022-10-29 NOTE — DISCHARGE INSTRUCTIONS
[facility]    [facility]  (aka [facility], aka [facility])  Serves [facility], and [facility] residents.  Serves uninsured patients & those with Medicaid.  Main location: [address] [place], [place] [id number] [phone]  Walk-in's available during regular business hours.  24/7 Crisis Line: [phone]    [facility]  (aka [facility], aka [facility])  Serves [facility].  Serves uninsured patients, those with Medicaid and some private plans.  Walk-in's available during regular business hours.  Primary care services available as well.  [facility]: [address] [facility] [address] [place] [address];  [phone]  [place]: [address] [place] [address];  [phone]  24/7 Crisis Line: [phone]    [facility]  Serves uninsured patients & those with Medicaid, call for more info.  Primary care, pediatrics, HIV treatment, and dentistry services available as well.  Three locations.  [phone]    [facility]  Serves patients with Medicaid, Medicare, and private insurance  [address]  [facility] [address] [phone]    [facility]  Serves uninsured on a sliding scale, as well as Medicaid, Medicare, and private plans.  Eight locations around the [facility] area.  [phone]    [facility]  Serves uninsured patients & those with Medicaid, private insurances.  Primary care available as well.  [phone]  [facility] [place] [address]    [facility]  Serves veterans who were honorably discharged.  [address] [place] [address]  [phone]  24/7 Veterans Crisis Line: [phone] (Press 1)    If you have private insurance and need to find a specialist, please contact your insurance  network to request a list of providers covered by your benefits.

## 2022-10-29 NOTE — ED NOTES
1 cell phone  1 birdie  8 TUMS tablets  1 wireless headphone   1 tube toothpaste  1 necklace  1 rosary  1 phone case  21 phone charging block  1 bar of chocolate  1  badge  2 chocolates  1 bubbles container  1 eye drops  4 quarters  10 june  6 pennies  1 dime  2 lip balm  1 pipe  3 pens  1 lotion  1 Yung Andres plastic hotdog whistle  1 yellow jacket  1 black jacket  1 gray shirt  1 purple sweater  1 white washcloth  1 umbrella   1 pair of keys  Vyvase  Quetiapine  Bag of pills  1 water bottle

## 2022-10-29 NOTE — ED NOTES
Informed by charge nurse Underwood, pt was requesting to use the phone to call her father, charge nurse went into room to provide phone, charge nurse Yasmine reports pt became confrontational, agitated, requesting to go home, not agreeing with staying in the hospital, dr. Singer made aware and reports he will go speak to pt, pt is made aware, ER Doctor Enmanuel will come speak to her,  made aware of pt's behavior, at this time, pt is sitting high herrmann in stretcher, calm,
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
## 2022-10-29 NOTE — ED NOTES
Appears to be sleeping awakening up to tactile stimulus, informed of order for repeat alcohol level, pt agrees to lab draw, informed of pending tele psych consult, explained what happens during tele psych consult, pt verbalized understanding, requesting water, bottle water provided, food offered pt refusing food at this time, a&ox3, in no distress, calm, cooperative, skin dry, warm, will continue to monitor, psych hold, psych tech madonna at bedside completing [id number] psych checks,
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
## 2022-10-29 NOTE — ED NOTES
Appears to be sleeping, lying prone in stretcher, eyes closed, awakening up, eyes opening to tactile stimulus, for vitals, pt agrees to vital, resp even non labored, pt denies SI/HI/VH/AH, denies pain, food and water offered, pt refused at this time, reports she is cold, warm blankets will be provided, pt is a psych hold, psych tech at bedside, completing [id number] psych checks, see documentation

## 2022-10-29 NOTE — ED PROVIDER NOTES
Source of History:  Patient, [facility]    Chief complaint:  Psychiatric Evaluation (Pt has been having unspecified thoughts about harming oneself and not caring whether she lives or dies )      HPI:  Roselyn Vieira is a 32 y.o. female presenting with police.  Patient reports she believes that her best friend her boyfriend called the police as the patient has been having suicidal thoughts.  She reports that she does not want to be alive anymore and that all the people in her life hate her and if she  tomorrow she would not care.  The patient denies any actual suicide attempt.  She had multiple drugs and paraphernalia present in her belongings upon arrival.    This is the extent to the patients complaints today here in the emergency department.    ROS: As per HPI and below:  General: No fever.  No chills.  Eyes: No visual changes.  ENT: No sore throat. No ear pain  Head: No headache.    Respiratory: No shortness of breath.  Cardiovascular: No chest pain.  Abdomen: No abdominal pain.  No nausea or vomiting.  Genito-Urinary: No abnormal urination.  Neurologic: No focal weakness.  No numbness.  MSK: no back pain.  Left hip pruritus  Integument:   Various bruises and skin lesions from IV drug use  Hematologic: No easy bruising.  Endocrine: No excessive thirst or urination.    Review of patient's allergies indicates:  No Known Allergies    PMH:  As per HPI and below:  Past Medical History:   Diagnosis Date    ADHD (attention deficit hyperactivity disorder)     Anxiety disorder, unspecified     Bipolar disorder, unspecified      Past Surgical History:   Procedure Laterality Date    INCISION AND DRAINAGE OF ABSCESS Left 10/2/2022    Procedure: INCISION AND DRAINAGE, ABSCESS;  Surgeon: Demar Raymundo MD;  Location: [facility];  Service: General;  Laterality: Left;  penrose  gauze pack  dressing       Social History     Tobacco Use    Smoking status: Every Day     Types: Vaping with nicotine    Smokeless tobacco:  "Never    Tobacco comments:     On nicotine replacement therapy   Substance Use Topics    Alcohol use: Yes     Alcohol/week: 2.0 - 3.0 standard drinks     Types: 2 - 3 Cans of beer per week    Drug use: Yes     Types: Marijuana       Physical Exam:    /66   Pulse 71   Temp 97.8 °F (36.6 °C) (Oral)   Resp 17   Ht 5' 9" (1.753 m)   Wt 59 kg (130 lb)   SpO2 99%   BMI 19.20 kg/m²   Physical Exam  Vitals and nursing note reviewed.   Constitutional:       General: She is not in acute distress.     Appearance: She is not ill-appearing.   HENT:      Head: Normocephalic and atraumatic.      Right Ear: External ear normal.      Left Ear: External ear normal.   Eyes:      Extraocular Movements: Extraocular movements intact.   Cardiovascular:      Rate and Rhythm: Normal rate and regular rhythm.      Heart sounds: Normal heart sounds.   Pulmonary:      Effort: Pulmonary effort is normal. No respiratory distress.      Breath sounds: Normal breath sounds. No stridor. No wheezing, rhonchi or rales.   Abdominal:      General: Abdomen is flat. There is no distension.      Palpations: Abdomen is soft.      Tenderness: There is no abdominal tenderness. There is no guarding or rebound.   Musculoskeletal:         General: Normal range of motion.   Skin:     General: Skin is warm.      Findings: No rash.   Neurological:      General: No focal deficit present.      Mental Status: She is alert. Mental status is at baseline.   Psychiatric:         Attention and Perception: She does not perceive visual hallucinations.         Mood and Affect: Affect is labile.         Behavior: Behavior is hyperactive.         Thought Content: Thought content includes suicidal ideation. Thought content does not include homicidal ideation. Thought content does not include homicidal or suicidal plan.         Judgment: Judgment is impulsive and inappropriate.           I decided to obtain the patient's medical records.  Summary of Medical " Records:  Recent surgical drainage of left hip abscess reviewed.    Labs:  Results for orders placed or performed during the hospital encounter of 10/29/22   CBC auto differential   Result Value Ref Range    WBC 6.88 3.90 - 12.70 K/uL    RBC 3.91 (L) 4.00 - 5.40 M/uL    Hemoglobin 11.7 (L) 12.0 - 16.0 g/dL    Hematocrit 35.2 (L) 37.0 - 48.5 %    MCV 90 82 - 98 fL    MCH 29.9 27.0 - 31.0 pg    MCHC 33.2 32.0 - 36.0 g/dL    RDW 15.1 (H) 11.5 - 14.5 %    Platelets 279 150 - 450 K/uL    MPV 9.9 9.2 - 12.9 fL    Immature Granulocytes 0.3 0.0 - 0.5 %    Gran # (ANC) 4.0 1.8 - 7.7 K/uL    Immature Grans (Abs) 0.02 0.00 - 0.04 K/uL    Lymph # 2.0 1.0 - 4.8 K/uL    Mono # 0.6 0.3 - 1.0 K/uL    Eos # 0.2 0.0 - 0.5 K/uL    Baso # 0.03 0.00 - 0.20 K/uL    nRBC 0 0 /100 WBC    Gran % 58.4 38.0 - 73.0 %    Lymph % 29.4 18.0 - 48.0 %    Mono % 9.2 4.0 - 15.0 %    Eosinophil % 2.3 0.0 - 8.0 %    Basophil % 0.4 0.0 - 1.9 %    Differential Method Automated    Comprehensive metabolic panel   Result Value Ref Range    Sodium 141 136 - 145 mmol/L    Potassium 3.3 (L) 3.5 - 5.1 mmol/L    Chloride 112 (H) 95 - 110 mmol/L    CO2 20 (L) 23 - 29 mmol/L    Glucose 107 70 - 110 mg/dL    BUN 9 6 - 20 mg/dL    Creatinine 0.8 0.5 - 1.4 mg/dL    Calcium 8.6 (L) 8.7 - 10.5 mg/dL    Total Protein 7.0 6.0 - 8.4 g/dL    Albumin 4.0 3.5 - 5.2 g/dL    Total Bilirubin 0.4 0.1 - 1.0 mg/dL    Alkaline Phosphatase 65 55 - 135 U/L    AST 20 10 - 40 U/L    ALT 10 10 - 44 U/L    Anion Gap 9 8 - 16 mmol/L    eGFR >60 >60 mL/min/1.73 m^2   TSH   Result Value Ref Range    TSH 2.389 0.400 - 4.000 uIU/mL   Urinalysis, Reflex to Urine Culture Urine, Clean Catch    Specimen: Urine   Result Value Ref Range    Specimen UA Urine, Clean Catch     Color, UA Yellow Yellow, Straw, Elizabeth    Appearance, UA Clear Clear    pH, UA 6.0 5.0 - 8.0    Specific Gravity, UA <=1.005 (A) 1.005 - 1.030    Protein, UA Negative Negative    Glucose, UA Negative Negative    Ketones, UA  Negative Negative    Bilirubin (UA) Negative Negative    Occult Blood UA Trace (A) Negative    Nitrite, UA Negative Negative    Urobilinogen, UA Negative <2.0 EU/dL    Leukocytes, UA Negative Negative   Drug screen panel, emergency   Result Value Ref Range    Benzodiazepines Negative Negatiive    Methadone metabolites Negative Negative    Cocaine (Metab.) Negative Negative    Opiate Scrn, Ur Negative Negative    Barbiturate Screen, Ur Negative Negative    Amphetamine Screen, Ur Presumptive Positive (A) Negative    THC Presumptive Positive (A) Negative    Phencyclidine Negative Negative    Creatinine, Urine 35.1 15.0 - 325.0 mg/dL    Toxicology Information SEE COMMENT    Ethanol   Result Value Ref Range    Alcohol, Serum 236 (H) <10 mg/dL   Acetaminophen level   Result Value Ref Range    Acetaminophen (Tylenol), Serum <3.0 (L) 10.0 - 20.0 ug/mL   Ethanol   Result Value Ref Range    Alcohol, Serum 89 (H) <10 mg/dL   POCT COVID-19 Rapid Screening   Result Value Ref Range    POC Rapid COVID Negative Negative     Acceptable Yes    POCT urine pregnancy   Result Value Ref Range    POC Preg Test, Ur Negative Negative     Acceptable Yes          Initial Impression  32 y.o. female with mostly passive suicidal ideations, however patient high risk with polysubstance abuse and friends contacted police due to concern patient will commit suicide.    Differential Dx:  Decompensated psychiatric disease (schizophrenia, bipolar disorder, major depression), excited delirium, medication noncompliance, substance abuse/withdrawal, intentional drug overdose, medication toxicity, APAP/ASA overdose, acute stress reaction, personality disorder, malingering, metabolic derangement      MDM:        ED Course as of 11/09/22 1537   Sat Oct 29, 2022   1044 Patient reports that she is not suicidal, feels that the true has not been told, would like to speak to her father, her boss others.  I explained to her that I  process is such that no communication is permitted at this time.  Discussed with her that we will repeat her blood alcohol level at 1:00 p.m., if it is at her near 100, we will contact tele psych and given their recommendations contact family. [MA]      ED Course User Index  [MA] Danilo Singer MD                   Diagnostic Impression:    1. Suicidal ideation    2. Alcoholic intoxication without complication    3. Methamphetamine abuse    4. Opiate dependence, continuous    5. Heroin abuse    6. Nicotine abuse         ED Disposition Condition    Discharge Stable            ED Prescriptions    None       Follow-up Information       Follow up With Specialties Details Why Contact Info    YOUR PCP  Call today               Archana Stanley MD  11/09/22 [id number]

## 2022-11-10 LAB — FUNGUS SPEC CULT: NORMAL

## 2022-11-11 ENCOUNTER — PATIENT MESSAGE (OUTPATIENT)
Dept: RESEARCH | Facility: HOSPITAL | Age: 32
End: 2022-11-11
Payer: MEDICAID

## 2022-11-21 LAB
ACID FAST MOD KINY STN SPEC: NORMAL
MYCOBACTERIUM SPEC QL CULT: NORMAL

## 2023-06-30 ENCOUNTER — PATIENT MESSAGE (OUTPATIENT)
Dept: RESEARCH | Facility: HOSPITAL | Age: 33
End: 2023-06-30
Payer: MEDICAID

## 2023-10-20 ENCOUNTER — CLINICAL SUPPORT (OUTPATIENT)
Dept: URGENT CARE | Facility: CLINIC | Age: 33
End: 2023-10-20

## 2023-10-20 DIAGNOSIS — Z02.0 SCHOOL HEALTH EXAMINATION: Primary | ICD-10-CM

## 2023-10-20 PROCEDURE — 86580 POCT TB SKIN TEST: ICD-10-PCS | Mod: S$GLB,,, | Performed by: PHYSICIAN ASSISTANT

## 2023-10-20 PROCEDURE — 86580 TB INTRADERMAL TEST: CPT | Mod: S$GLB,,, | Performed by: PHYSICIAN ASSISTANT

## 2023-10-20 NOTE — PROGRESS NOTES
Patient is here for PPD placement.   She was advised to come back go to ANY urgent care after 48hrs no later than 72hrs.

## 2023-10-22 LAB
TB INDURATION - 48 HR READ: 0 MM
TB INDURATION - 72 HR READ: 0 MM
TB SKIN TEST - 48 HR READ: NEGATIVE
TB SKIN TEST - 72 HR READ: NEGATIVE

## (undated) DEVICE — ELECTRODE REM PLYHSV RETURN 9

## (undated) DEVICE — SOL PVP-I SCRUB 7.5% 4OZ

## (undated) DEVICE — SPONGE SUPER KERLIX 6X6.75IN

## (undated) DEVICE — DRAPE LAP T SHT W/ INSTR PAD

## (undated) DEVICE — NDL 18GA X1 1/2 REG BEVEL

## (undated) DEVICE — COLLECTOR SPECIMEN ANAEROBIC

## (undated) DEVICE — SEE L#120831

## (undated) DEVICE — NDL HYPO REG 25G X 1 1/2

## (undated) DEVICE — GOWN SMART IMP BREATHABLE XXLG

## (undated) DEVICE — BRIEF MESH LARGE

## (undated) DEVICE — SUT 2/0 30IN SILK BLK BRAI

## (undated) DEVICE — SWAB CULTURETTE II DUAL

## (undated) DEVICE — SOL BETADINE 5%

## (undated) DEVICE — APPLICATOR CHLORAPREP ORN 26ML

## (undated) DEVICE — GLOVE BIOGEL SKINSENSE PI 7.0

## (undated) DEVICE — STRIP PACKING ANTIMIC 1/4X1

## (undated) DEVICE — Device

## (undated) DEVICE — SUT VICRYL PLUS 3-0 SH 18IN

## (undated) DEVICE — SYR 10CC LUER LOCK

## (undated) DEVICE — GLOVE BIOGEL 7.0

## (undated) DEVICE — GLOVE BIOGEL 7.5

## (undated) DEVICE — STRIP WOUND PK BIGUANIDE 36X.5

## (undated) DEVICE — JELLY SURGILUBE LUBE TUBE 2OZ

## (undated) DEVICE — SPONGE COTTON TRAY 4X4IN

## (undated) DEVICE — DRAPE TOP 53X102IN